# Patient Record
Sex: MALE | Race: WHITE | Employment: FULL TIME | URBAN - METROPOLITAN AREA
[De-identification: names, ages, dates, MRNs, and addresses within clinical notes are randomized per-mention and may not be internally consistent; named-entity substitution may affect disease eponyms.]

---

## 2017-04-15 RX ORDER — ALPRAZOLAM 0.5 MG/1
TABLET ORAL
Qty: 180 TABLET | Refills: 0 | OUTPATIENT
Start: 2017-04-15

## 2017-04-15 NOTE — TELEPHONE ENCOUNTER
Please advise on refill request.     Recent Visits       Provider Department Primary Dx    5 months ago Vishal Hyman MD [facility], [facility], [address] Anxiety    7 months ago Vishal Hyman MD [facility], [facility], [address]

## 2017-04-18 RX ORDER — ALPRAZOLAM 0.5 MG/1
TABLET ORAL
Qty: 180 TABLET | Refills: 0 | OUTPATIENT
Start: 2017-04-18 | End: 2017-06-22

## 2017-04-18 NOTE — TELEPHONE ENCOUNTER
Message was left on voice mail that the rx for Alprazolam was not filled that it will have to be gotten from his psychiatrist.

## 2017-06-01 ENCOUNTER — TELEPHONE (OUTPATIENT)
Dept: INTERNAL MEDICINE CLINIC | Facility: CLINIC | Age: 57
End: 2017-06-01

## 2017-06-01 NOTE — TELEPHONE ENCOUNTER
Spoke to pt. Informed pt Dr. Holli Powell had last filled rx on 4/18/17 for a 90 day supply. Pt states never picked up the rx and was informed by pharmacy there was no rx to pick.  Informed pt pharmacy would be called to verify if rx order was received by ph

## 2017-06-01 NOTE — TELEPHONE ENCOUNTER
Current Outpatient Prescriptions:  ALPRAZOLAM 0.5 MG Oral Tab TAKE 1 TABLET BY MOUTH TWICE DAILY.  Disp: 180 tablet Rfl: 0     Pt out of meds  Made 6/19 px appt with [facility]- first available   Asking if can get enough to hold over until appt   Requesting call

## 2017-06-01 NOTE — TELEPHONE ENCOUNTER
Pt states he is having anxiety symptoms-out of medication  Has 6/19 appt with [facility]  Asking if can get alprazolam refilled to hold over to appt-see other 6/1 encounter

## 2017-06-01 NOTE — TELEPHONE ENCOUNTER
Dr Fonseca Marrow  Patient has not made appt with psych yet, will see you for a physical 6/22/17, he had last alprazolam rx filled today which will last until 6/16/17 and is requesting an rx to last through his appt 6/22/17

## 2017-06-01 NOTE — TELEPHONE ENCOUNTER
Contacted pharmacy, they have rx for alprazolam 0.5mg 1 po BID #30 called in 4/20/17 and will fill it and contact the patient when it is ready for     Patient contacted re refill, it is only for 15 days and his appt is 6/19/17, patient was asked if

## 2017-06-01 NOTE — TELEPHONE ENCOUNTER
LMTCB PO please transfer to [facility] at [phone] today or RN at [address] contacted [place], last alprazolam dispensed 4/5/17 #60

## 2017-06-02 NOTE — TELEPHONE ENCOUNTER
also patient just  Had   180   Tab  In April /18  -should last until -7/18  pt must  See psychiatrist -  For  Future  Refills as  dicussed  At  Last Visit time  -

## 2017-06-02 NOTE — TELEPHONE ENCOUNTER
[facility] was informed he was only given a 30 day supply. He does have one more refill with understanding.

## 2017-06-22 ENCOUNTER — OFFICE VISIT (OUTPATIENT)
Dept: INTERNAL MEDICINE CLINIC | Facility: CLINIC | Age: 57
End: 2017-06-22

## 2017-06-22 VITALS
WEIGHT: 219 LBS | SYSTOLIC BLOOD PRESSURE: 132 MMHG | TEMPERATURE: 98 F | RESPIRATION RATE: 20 BRPM | HEIGHT: 76 IN | HEART RATE: 61 BPM | DIASTOLIC BLOOD PRESSURE: 88 MMHG | BODY MASS INDEX: 26.67 KG/M2

## 2017-06-22 DIAGNOSIS — F41.9 ANXIETY: ICD-10-CM

## 2017-06-22 DIAGNOSIS — Z13.6 SCREENING FOR HEART DISEASE: ICD-10-CM

## 2017-06-22 DIAGNOSIS — Z12.5 SCREENING PSA (PROSTATE SPECIFIC ANTIGEN): ICD-10-CM

## 2017-06-22 DIAGNOSIS — R35.0 FREQUENT URINATION: ICD-10-CM

## 2017-06-22 DIAGNOSIS — Z00.00 PHYSICAL EXAM: Primary | ICD-10-CM

## 2017-06-22 PROCEDURE — 99396 PREV VISIT EST AGE 40-64: CPT | Performed by: INTERNAL MEDICINE

## 2017-06-22 RX ORDER — ALPRAZOLAM 0.5 MG/1
TABLET ORAL
Qty: 60 TABLET | Refills: 0 | Status: SHIPPED | OUTPATIENT
Start: 2017-06-22 | End: 2017-07-29

## 2017-06-22 NOTE — PROGRESS NOTES
HPI:    Patient ID: Yee Harley is a 60year old male.     HPI Comments: Patient patient presents today for physical exam, states doing well otherwise, denies chest pain, shortness of breath, dyspnea on exertion or heart palpitations also denies nausea, vo Prescriptions:  ALPRAZolam 0.5 MG Oral Tab TAKE 1 TABLET BY MOUTH ONCE  DAILY. Disp: 60 tablet Rfl: 0     Allergies:No Known Allergies   PHYSICAL EXAM:   Physical Exam   Constitutional: He is oriented to person, place, and time.  He appears well-developed a On xanex  Stable    Nursing note and vitals reviewed. Blood pressure 132/88, pulse 61, temperature 97.9 °F (36.6 °C), temperature source Oral, resp. rate 20, height 6' 4\" (1.93 m), weight 219 lb (99.338 kg). ASSESSMENT/PLAN:   1.  Physical e PSYCHIATRY  UROLOGY - EXTERNAL  EKG 12-LEAD       #[id number]

## 2017-07-01 ENCOUNTER — LAB ENCOUNTER (OUTPATIENT)
Dept: LAB | Facility: HOSPITAL | Age: 57
End: 2017-07-01
Attending: INTERNAL MEDICINE
Payer: COMMERCIAL

## 2017-07-01 DIAGNOSIS — Z12.5 SCREENING PSA (PROSTATE SPECIFIC ANTIGEN): ICD-10-CM

## 2017-07-01 DIAGNOSIS — Z00.00 PHYSICAL EXAM: ICD-10-CM

## 2017-07-01 LAB
ALBUMIN SERPL BCP-MCNC: 4.2 G/DL (ref 3.5–4.8)
ALBUMIN/GLOB SERPL: 1.6 {RATIO} (ref 1–2)
ALP SERPL-CCNC: 43 U/L (ref 32–100)
ALT SERPL-CCNC: 34 U/L (ref 17–63)
ANION GAP SERPL CALC-SCNC: 9 MMOL/L (ref 0–18)
AST SERPL-CCNC: 30 U/L (ref 15–41)
BACTERIA UR QL AUTO: NEGATIVE /HPF
BASOPHILS # BLD: 0 K/UL (ref 0–0.2)
BASOPHILS NFR BLD: 1 %
BILIRUB SERPL-MCNC: 0.5 MG/DL (ref 0.3–1.2)
BUN SERPL-MCNC: 15 MG/DL (ref 8–20)
BUN/CREAT SERPL: 14.3 (ref 10–20)
CALCIUM SERPL-MCNC: 9.4 MG/DL (ref 8.5–10.5)
CHLORIDE SERPL-SCNC: 104 MMOL/L (ref 95–110)
CHOLEST SERPL-MCNC: 170 MG/DL (ref 110–200)
CO2 SERPL-SCNC: 27 MMOL/L (ref 22–32)
CREAT SERPL-MCNC: 1.05 MG/DL (ref 0.5–1.5)
EOSINOPHIL # BLD: 0.2 K/UL (ref 0–0.7)
EOSINOPHIL NFR BLD: 3 %
ERYTHROCYTE [DISTWIDTH] IN BLOOD BY AUTOMATED COUNT: 13 % (ref 11–15)
GLOBULIN PLAS-MCNC: 2.7 G/DL (ref 2.5–3.7)
GLUCOSE SERPL-MCNC: 102 MG/DL (ref 70–99)
HCT VFR BLD AUTO: 49 % (ref 41–52)
HDLC SERPL-MCNC: 42 MG/DL
HGB BLD-MCNC: 16.7 G/DL (ref 13.5–17.5)
LDLC SERPL CALC-MCNC: 117 MG/DL (ref 0–99)
LYMPHOCYTES # BLD: 1.3 K/UL (ref 1–4)
LYMPHOCYTES NFR BLD: 27 %
MCH RBC QN AUTO: 31.9 PG (ref 27–32)
MCHC RBC AUTO-ENTMCNC: 34 G/DL (ref 32–37)
MCV RBC AUTO: 93.8 FL (ref 80–100)
MONOCYTES # BLD: 0.5 K/UL (ref 0–1)
MONOCYTES NFR BLD: 10 %
NEUTROPHILS # BLD AUTO: 2.8 K/UL (ref 1.8–7.7)
NEUTROPHILS NFR BLD: 59 %
NONHDLC SERPL-MCNC: 128 MG/DL
OSMOLALITY UR CALC.SUM OF ELEC: 291 MOSM/KG (ref 275–295)
PLATELET # BLD AUTO: 143 K/UL (ref 140–400)
PMV BLD AUTO: 8.4 FL (ref 7.4–10.3)
POTASSIUM SERPL-SCNC: 4.6 MMOL/L (ref 3.3–5.1)
PROT SERPL-MCNC: 6.9 G/DL (ref 5.9–8.4)
PSA SERPL-MCNC: 1.2 NG/ML (ref 0–4)
RBC # BLD AUTO: 5.22 M/UL (ref 4.5–5.9)
RBC #/AREA URNS AUTO: <1 /HPF
SODIUM SERPL-SCNC: 140 MMOL/L (ref 136–144)
TRIGL SERPL-MCNC: 54 MG/DL (ref 1–149)
TSH SERPL-ACNC: 2.57 UIU/ML (ref 0.45–5.33)
WBC # BLD AUTO: 4.7 K/UL (ref 4–11)
WBC #/AREA URNS AUTO: <1 /HPF

## 2017-07-01 PROCEDURE — 80061 LIPID PANEL: CPT

## 2017-07-01 PROCEDURE — 81015 MICROSCOPIC EXAM OF URINE: CPT

## 2017-07-01 PROCEDURE — 85025 COMPLETE CBC W/AUTO DIFF WBC: CPT

## 2017-07-01 PROCEDURE — 84443 ASSAY THYROID STIM HORMONE: CPT

## 2017-07-01 PROCEDURE — 36415 COLL VENOUS BLD VENIPUNCTURE: CPT

## 2017-07-01 PROCEDURE — 80053 COMPREHEN METABOLIC PANEL: CPT

## 2017-07-06 ENCOUNTER — TELEPHONE (OUTPATIENT)
Dept: INTERNAL MEDICINE CLINIC | Facility: CLINIC | Age: 57
End: 2017-07-06

## 2017-07-07 NOTE — TELEPHONE ENCOUNTER
Actions Requested: further recommendations-appt made with dr Isabell Dominique  Problem: above right eye brow -sharp stabbing pain  Onset and Timing: just happen -but 3rd time, started last week   Associated Symptoms :x 1week  old chest discomfort-productive cough,h/a steroids)  * Severe headache (e.g., excruciating) and has had severe headaches before  * Severe headache and not relieved by pain meds  * Severe headache and vomiting  * Severe headache and fever  * New headache and weak immune system (e.g., HIV positive,

## 2017-07-10 NOTE — TELEPHONE ENCOUNTER
Patient calling back, informed patient of Dr Abimael Suarez note. Patient also requested copy of labs sent to his home. Done.

## 2017-07-10 NOTE — TELEPHONE ENCOUNTER
Collected:  7/1/2017  8:07 AM Status:  Final result Dx:  Physical exam   Notes Recorded by Jude Alicia MD on 7/8/2017 at 8:21 AM CDT  Please  Call  Patient with Blood/urine  test results that are stable/ within normal limits/ except mildly elevated

## 2017-07-27 ENCOUNTER — APPOINTMENT (OUTPATIENT)
Dept: CT IMAGING | Age: 57
End: 2017-07-27
Attending: FAMILY MEDICINE
Payer: COMMERCIAL

## 2017-07-27 ENCOUNTER — HOSPITAL ENCOUNTER (OUTPATIENT)
Age: 57
Discharge: HOME | End: 2017-07-27
Attending: FAMILY MEDICINE
Payer: COMMERCIAL

## 2017-07-27 VITALS
HEIGHT: 76 IN | HEART RATE: 52 BPM | OXYGEN SATURATION: 100 % | SYSTOLIC BLOOD PRESSURE: 135 MMHG | RESPIRATION RATE: 16 BRPM | TEMPERATURE: 98 F | WEIGHT: 225 LBS | DIASTOLIC BLOOD PRESSURE: 94 MMHG | BODY MASS INDEX: 27.4 KG/M2

## 2017-07-27 DIAGNOSIS — G44.019 EPISODIC CLUSTER HEADACHE, NOT INTRACTABLE: Primary | ICD-10-CM

## 2017-07-27 DIAGNOSIS — G93.89 ENCEPHALOMALACIA ON IMAGING STUDY: ICD-10-CM

## 2017-07-27 PROCEDURE — 99204 OFFICE O/P NEW MOD 45 MIN: CPT

## 2017-07-27 PROCEDURE — 70450 CT HEAD/BRAIN W/O DYE: CPT | Performed by: FAMILY MEDICINE

## 2017-07-27 NOTE — ED PROVIDER NOTES
Patient Seen in: [address] [facility]    History   Patient presents with:  Headache (neurologic)    Stated Complaint: sharp head pains    HPI    57-year-old gentleman presents to immediate care with pain ne no URI symptoms ar the right eye abo today and agreed except as otherwise stated in HPI.     Physical Exam   ED Triage Vitals [07/27/17 1638]  BP: 147/91  Pulse: 64  Resp: 20  Temp: 97.7 °F (36.5 °C)  Temp src: Temporal  SpO2: 100 %  O2 Device: None (Room air)    Current:/91   Pulse 64 generalized tension pains. No trauma or visual disturbances. FINDINGS:   VENTRICLES/SULCI:  Ventricles and sulci are normal in size. INTRACRANIAL:  There are no abnormal extraaxial fluid collections. There is no midline shift.   There are no acute int day      April Barrera MD  [address]  [phone]    Call in 1 day        Medications Prescribed:  Current Discharge Medication List

## 2017-07-28 ENCOUNTER — TELEPHONE (OUTPATIENT)
Dept: INTERNAL MEDICINE CLINIC | Facility: CLINIC | Age: 57
End: 2017-07-28

## 2017-07-28 NOTE — TELEPHONE ENCOUNTER
Actions Requested: ERIN F/U appt scheduled w/ Dr Karmen Centeno  7/29/17 as PCP out of office  Problem: pt is  and sudden pain above right eye and very sharp  Onset and Timing: > 1 month  Associated Symptoms: pain is intermittent always above right eye ne Rest  * Apply Cold to the Area  * Stretching  * Reasons To Call Back      - Headache lasts longer than 24 hours      - You become worse    Patient provided with clinic contact information, hours of operation and will call back if needed.  Patient was able t

## 2017-07-29 NOTE — PATIENT INSTRUCTIONS
Treating Anxiety Disorders with Medication  An anxiety disorder can make you feel nervous or apprehensive, even without a clear reason. Certain anxiety disorders can cause intense feelings of fear or panic.  You may even have physical symptoms, such as a · Antidepressant medication: This kind of medication is often used to treat anxiety, even if you aren’t depressed. An antidepressant helps balance out brain chemicals. This helps keep anxiety under control. This medication is taken on a schedule.  It takes · Addiction: Antidepressants are not addictive. And if Naty People never had a problem with drugs or alcohol, you likely won’t have a problem with anti-anxiety medication. But if you have history of addiction, you may need to avoid this medication.    Date Last

## 2017-07-29 NOTE — PROGRESS NOTES
Patient ID: Ellie Lang is a 60year old male. Patient presents with:  Headache: sharp pain, lots of stress at home        HISTORY OF PRESENT ILLNESS:   HPI  Patient presents for above.   Here for follow-up from urgent care center after having sharp ocul Marlex plug and mesh technique per Dr. Chi Jim      Current Outpatient Prescriptions:   •  ALPRAZolam 0.5 MG Oral Tab, TAKE 1 TABLET BY MOUTH ONCE  DAILY. , Disp: 60 tablet, Rfl: 2    Allergies:No Known Allergies      Social History  Social History

## 2017-08-16 ENCOUNTER — TELEPHONE (OUTPATIENT)
Dept: INTERNAL MEDICINE CLINIC | Facility: CLINIC | Age: 57
End: 2017-08-16

## 2017-08-16 NOTE — TELEPHONE ENCOUNTER
Pt stts he has questions regarding ALPRAZolam 0.5 MG Oral Tab. Pt asking to speak with Dr. Ree Farah.

## 2017-08-19 NOTE — TELEPHONE ENCOUNTER
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

## 2017-09-28 ENCOUNTER — TELEPHONE (OUTPATIENT)
Dept: INTERNAL MEDICINE CLINIC | Facility: CLINIC | Age: 57
End: 2017-09-28

## 2017-09-28 DIAGNOSIS — F41.9 ANXIETY: ICD-10-CM

## 2017-09-28 NOTE — TELEPHONE ENCOUNTER
Pt is requesting a to take medication below 2x a day due to the Pt taking it that way when he was seeing   Dr. Bria Mars. Bryce KESSLER Current Outpatient Prescriptions:   •  ALPRAZolam 0.5 MG Oral Tab, TAKE 1 TABLET BY MOUTH ONCE  DAILY. , Disp: 60 tablet

## 2017-09-29 RX ORDER — ALPRAZOLAM 0.5 MG/1
TABLET ORAL
Qty: 60 TABLET | Refills: 2 | Status: SHIPPED | OUTPATIENT
Start: 2017-09-29 | End: 2018-01-04

## 2017-09-29 NOTE — TELEPHONE ENCOUNTER
Chart reviewed again, encounter 9/20/11 under medication started or stopped shows:     Message copied:   Medications started this visit  Start Date    Med                                Dose                  Raina Ghee        Refills    Sig  09/20/2011  Barber

## 2017-09-29 NOTE — TELEPHONE ENCOUNTER
Dr Aldo Llanos,    See pts' message below and advise on new alprazolam script. It is pended for editing and signoff.      Refill Protocol Appointment Criteria  · Appointment scheduled in the past 6 months or in the next 3 months  Recent Outpatient Visits

## 2017-09-29 NOTE — TELEPHONE ENCOUNTER
Do you see anywhere from his prior prescriptions that he was taking it twice a day as needed? I was unable to locate any.

## 2017-09-29 NOTE — TELEPHONE ENCOUNTER
Alprazolam 0.5mg script called into [facility] pharmacy in [place], [place] attention to [facility] per Dr Rosey Trinidad. Lonnie Zavaleta erx  written orders. Pt informed and thankful.

## 2017-10-13 ENCOUNTER — TELEPHONE (OUTPATIENT)
Dept: FAMILY MEDICINE CLINIC | Facility: CLINIC | Age: 57
End: 2017-10-13

## 2017-10-13 NOTE — TELEPHONE ENCOUNTER
Patient is schedule for Monday at 3:30 pm for a NP Exam - Migraines/head pains. 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  Wants to know if Dr Khris White will approve for him to come in on Saturday 10/21/17 ins [id number]

## 2017-10-13 NOTE — TELEPHONE ENCOUNTER
I already have two medication follow ups and a physical for that day. He can see me on a Monday night or my next Saturday, but I need to leave room for acute visits also on 10/21. Thanks.

## 2017-10-16 ENCOUNTER — TELEPHONE (OUTPATIENT)
Dept: FAMILY MEDICINE CLINIC | Facility: CLINIC | Age: 57
End: 2017-10-16

## 2017-10-16 NOTE — TELEPHONE ENCOUNTER
Patient called at 4:15 pm to say that he was at [address] and would be another 20 minutes at least. Advised that per the nurse he has missed his appointment time and would need to reschedule as Dr Mike Wallace is booked for the rest of the day.  Patient asked

## 2017-11-07 NOTE — PROGRESS NOTES
Jarrett Tarango is a 59year old male. Patient presents with:  Establish Care  Migraine      HPI:   Headaches started 2-3 months ago. Sharp electric shock pain above his right eye. Came and went quickly over 1-2 seconds.  2-3 weeks later, happened again on th BP Readings from Last 6 Encounters:  11/06/17 : 130/82  07/29/17 : 135/84  07/27/17 : 135/94  06/22/17 : 132/88  02/15/17 : 134/86  11/07/16 : 130/70      Wt Readings from Last 6 Encounters:  11/06/17 : 230 lb  07/29/17 : 223 lb  07/27/17 : 225 lb  06/ escitalopram 10 MG Oral Tab; Take 1 tablet (10 mg total) by mouth daily. Take 1/2 tab x 6 days, then take 1 tab po qd.   -     Cancel: OP REFERRAL TO [facility]  -     OP REFERRAL TO [facility]    Tension headache  - discussed neck stretching, heat to the neck,

## 2018-01-04 ENCOUNTER — TELEPHONE (OUTPATIENT)
Dept: FAMILY MEDICINE CLINIC | Facility: CLINIC | Age: 58
End: 2018-01-04

## 2018-01-04 DIAGNOSIS — F41.9 ANXIETY: ICD-10-CM

## 2018-01-04 RX ORDER — ALPRAZOLAM 0.5 MG/1
TABLET ORAL
Qty: 60 TABLET | Refills: 0 | Status: SHIPPED | OUTPATIENT
Start: 2018-01-04 | End: 2018-02-06

## 2018-01-04 NOTE — TELEPHONE ENCOUNTER
Pt called, needs refill on ALPRAZolam 0.5 MG Oral Tab. [facility]   Please call pt at [phone]

## 2018-01-04 NOTE — TELEPHONE ENCOUNTER
Script printed. Please fax. He has appt next week, will see him before refills to see how lexapro is working.

## 2018-01-04 NOTE — TELEPHONE ENCOUNTER
Pt called, needs refill on ALPRAZolam 0.5 MG Oral Tab. Evzyqvxr-Hkvqxgxcv-Rjbarrrk.    Please call pt at [phone]

## 2018-01-04 NOTE — TELEPHONE ENCOUNTER
Script faxed. Patient has cancelled f/u for next week.   rescheduled for tomorrow    Future Appointments  Date Time Provider Sunni Huerta   1/5/2018 1:00 PM Cedric Howard [facility] VANESSA Samson

## 2018-01-05 ENCOUNTER — OFFICE VISIT (OUTPATIENT)
Dept: FAMILY MEDICINE CLINIC | Facility: CLINIC | Age: 58
End: 2018-01-05

## 2018-01-05 VITALS
WEIGHT: 234 LBS | HEIGHT: 76 IN | DIASTOLIC BLOOD PRESSURE: 80 MMHG | TEMPERATURE: 98 F | RESPIRATION RATE: 16 BRPM | BODY MASS INDEX: 28.49 KG/M2 | HEART RATE: 64 BPM | SYSTOLIC BLOOD PRESSURE: 130 MMHG

## 2018-01-05 DIAGNOSIS — F41.1 GAD (GENERALIZED ANXIETY DISORDER): ICD-10-CM

## 2018-01-05 DIAGNOSIS — F41.9 ANXIETY: ICD-10-CM

## 2018-01-05 PROCEDURE — 99214 OFFICE O/P EST MOD 30 MIN: CPT | Performed by: FAMILY MEDICINE

## 2018-01-05 RX ORDER — ESCITALOPRAM OXALATE 10 MG/1
10 TABLET ORAL DAILY
Qty: 30 TABLET | Refills: 1 | Status: SHIPPED | OUTPATIENT
Start: 2018-01-05 | End: 2018-04-19

## 2018-01-05 NOTE — PROGRESS NOTES
Christopher Iglesias is a 59year old male. Patient presents with: Follow - Up: on medications per pt      HPI:   He is still taking alprazolam as he has been. Has not started lexapro. He is taking 1 tab of xanax twice daily.  At times he takes 2 tabs once per [employer] rashes  RESPIRATORY: denies shortness of breath with exertion  CARDIOVASCULAR: denies chest pain on exertion  GI: denies abdominal pain and denies heartburn  NEURO: denies headaches    EXAM:   /80   Pulse 64   Temp (!) 97.5 °F (36.4 °C) (Temporal)

## 2018-01-23 DIAGNOSIS — F41.9 ANXIETY: ICD-10-CM

## 2018-01-25 RX ORDER — ALPRAZOLAM 0.5 MG/1
TABLET ORAL
Qty: 60 TABLET | Refills: 0 | OUTPATIENT
Start: 2018-01-25

## 2018-02-06 DIAGNOSIS — F41.9 ANXIETY: ICD-10-CM

## 2018-02-06 RX ORDER — ALPRAZOLAM 0.5 MG/1
TABLET ORAL
Qty: 60 TABLET | Refills: 0 | Status: SHIPPED | OUTPATIENT
Start: 2018-02-06 | End: 2018-03-09

## 2018-02-06 NOTE — TELEPHONE ENCOUNTER
PT CALLED AND ADV NEEDS REFILL OF     ALPRAZolam 0.5 MG Oral Tab    PLEASE SEND TO [facility]    (PT ADV OTHER MED THAT DR TRY TO GIVE TO WEAN OFF, MADE PT NAUSEA)

## 2018-02-06 NOTE — TELEPHONE ENCOUNTER
Script faxed. Patient notified alprazolam was faxed to the pharmacy. States he tried the escitalopram for 7-8 days but he did not feel any better and was nauseous so he stopped it.     Dr Tico Bean notified

## 2018-03-09 DIAGNOSIS — F41.9 ANXIETY: ICD-10-CM

## 2018-03-09 RX ORDER — ALPRAZOLAM 0.5 MG/1
TABLET ORAL
Qty: 60 TABLET | Refills: 0 | Status: SHIPPED | OUTPATIENT
Start: 2018-03-09 | End: 2018-04-10

## 2018-04-10 DIAGNOSIS — F41.9 ANXIETY: ICD-10-CM

## 2018-04-10 RX ORDER — ALPRAZOLAM 0.5 MG/1
TABLET ORAL
Qty: 60 TABLET | Refills: 2 | Status: SHIPPED | OUTPATIENT
Start: 2018-04-10 | End: 2018-07-16

## 2018-04-10 NOTE — TELEPHONE ENCOUNTER
I can give him 2 refills, but he needs to see me after that. I need to see him every 6 months while on this medication. Script printed.

## 2018-04-10 NOTE — TELEPHONE ENCOUNTER
Patient notified and verbalized understanding.   He will schedule before he runs out  Script faxed to [facility]

## 2018-04-18 ENCOUNTER — TELEPHONE (OUTPATIENT)
Dept: FAMILY MEDICINE CLINIC | Facility: CLINIC | Age: 58
End: 2018-04-18

## 2018-04-18 NOTE — TELEPHONE ENCOUNTER
Patient advised. Verbalized understanding. Patient stated he can only be seen tomorrow, Thursday before noon. Advised that Dr Matthew Velez is booked tomorrow and she is out of the office today and would forward her a message, but may not get a response tonight.

## 2018-04-18 NOTE — TELEPHONE ENCOUNTER
Patient advised.  Scheduled for 10:00am on Thursday     Future Appointments  Date Time Provider Sunni Huerta   4/19/2018 10:00 AM DO BALDOMERO Weller

## 2018-04-18 NOTE — TELEPHONE ENCOUNTER
Take Motrin, total dose 800 mg for the pain tonight if no contraindication to taking it.  See Dr. Gómez Bell tomorrow, thanks

## 2018-04-18 NOTE — TELEPHONE ENCOUNTER
Call from patient. Patient picked up a large window to unload it at work yesterday and set it on the ground. Ever since then has had lower back pain. Patient stated that today when he is moving windows the pain shoots up his back.  Patient took aspirin to h

## 2018-04-19 ENCOUNTER — OFFICE VISIT (OUTPATIENT)
Dept: FAMILY MEDICINE CLINIC | Facility: CLINIC | Age: 58
End: 2018-04-19

## 2018-04-19 VITALS
RESPIRATION RATE: 18 BRPM | HEART RATE: 56 BPM | WEIGHT: 236.81 LBS | BODY MASS INDEX: 29 KG/M2 | TEMPERATURE: 98 F | SYSTOLIC BLOOD PRESSURE: 108 MMHG | DIASTOLIC BLOOD PRESSURE: 80 MMHG

## 2018-04-19 DIAGNOSIS — T14.8XXA MUSCLE STRAIN: ICD-10-CM

## 2018-04-19 DIAGNOSIS — S39.012A BACK STRAIN, INITIAL ENCOUNTER: Primary | ICD-10-CM

## 2018-04-19 PROCEDURE — 99214 OFFICE O/P EST MOD 30 MIN: CPT | Performed by: FAMILY MEDICINE

## 2018-04-19 RX ORDER — NAPROXEN 500 MG/1
500 TABLET ORAL 2 TIMES DAILY WITH MEALS
Qty: 30 TABLET | Refills: 0 | Status: SHIPPED | OUTPATIENT
Start: 2018-04-19 | End: 2018-07-16 | Stop reason: ALTCHOICE

## 2018-04-19 NOTE — PROGRESS NOTES
Fabricio Montague is a 59year old male. 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  He felt his back pu Temp 98.1 °F (36.7 °C) (Temporal)   Resp 18   Wt 236 lb 12.8 oz   BMI 28.82 kg/m²  Body mass index is 28.82 kg/m².       GENERAL: well developed, well nourished,in no apparent distress  SKIN: no rashes,no suspicious lesions  HEENT: atraumatic, normocephali

## 2018-04-23 ENCOUNTER — APPOINTMENT (OUTPATIENT)
Dept: OTHER | Facility: HOSPITAL | Age: 58
End: 2018-04-23
Attending: PREVENTIVE MEDICINE

## 2018-04-23 ENCOUNTER — OFFICE VISIT (OUTPATIENT)
Dept: FAMILY MEDICINE CLINIC | Facility: CLINIC | Age: 58
End: 2018-04-23

## 2018-04-23 VITALS
SYSTOLIC BLOOD PRESSURE: 120 MMHG | TEMPERATURE: 98 F | RESPIRATION RATE: 16 BRPM | DIASTOLIC BLOOD PRESSURE: 70 MMHG | WEIGHT: 235 LBS | HEART RATE: 60 BPM | BODY MASS INDEX: 29 KG/M2

## 2018-04-23 DIAGNOSIS — S39.012A BACK STRAIN, INITIAL ENCOUNTER: Primary | ICD-10-CM

## 2018-04-23 DIAGNOSIS — T14.8XXA MUSCLE STRAIN: ICD-10-CM

## 2018-04-23 PROCEDURE — 99213 OFFICE O/P EST LOW 20 MIN: CPT | Performed by: FAMILY MEDICINE

## 2018-04-23 RX ORDER — CYCLOBENZAPRINE HCL 5 MG
5 TABLET ORAL 3 TIMES DAILY PRN
Qty: 10 TABLET | Refills: 0 | Status: SHIPPED | OUTPATIENT
Start: 2018-04-23 | End: 2018-07-16 | Stop reason: ALTCHOICE

## 2018-04-23 NOTE — PROGRESS NOTES
Ziyad Pinto is a 59year old male. Patient presents with:  Back Pain      HPI:   Pt is here for follow up for a back injury that occurred 1 week ago, on 4/16/18 while at work. He was lifting a heavy window and he felt his back pull.      Worked on Friday, as above     EXAM:   /70   Pulse 60   Temp 97.9 °F (36.6 °C) (Temporal)   Resp 16   Wt 235 lb   BMI 28.61 kg/m²  Body mass index is 28.61 kg/m².       GENERAL: well developed, well nourished,in no apparent distress  SKIN: no rashes,no suspicious lesio

## 2018-04-27 ENCOUNTER — OFFICE VISIT (OUTPATIENT)
Dept: FAMILY MEDICINE CLINIC | Facility: CLINIC | Age: 58
End: 2018-04-27

## 2018-04-27 ENCOUNTER — TELEPHONE (OUTPATIENT)
Dept: FAMILY MEDICINE CLINIC | Facility: CLINIC | Age: 58
End: 2018-04-27

## 2018-04-27 VITALS
DIASTOLIC BLOOD PRESSURE: 68 MMHG | WEIGHT: 232 LBS | OXYGEN SATURATION: 98 % | SYSTOLIC BLOOD PRESSURE: 118 MMHG | TEMPERATURE: 98 F | RESPIRATION RATE: 16 BRPM | HEART RATE: 58 BPM | BODY MASS INDEX: 28 KG/M2

## 2018-04-27 DIAGNOSIS — S39.012D BACK STRAIN, SUBSEQUENT ENCOUNTER: Primary | ICD-10-CM

## 2018-04-27 DIAGNOSIS — T14.8XXA MUSCLE STRAIN: ICD-10-CM

## 2018-04-27 PROCEDURE — 99213 OFFICE O/P EST LOW 20 MIN: CPT | Performed by: FAMILY MEDICINE

## 2018-04-27 NOTE — TELEPHONE ENCOUNTER
Per verbal conversation with Dr. Geneva Rodriguez she spoke with the pt directly and handled this matter.

## 2018-04-27 NOTE — PROGRESS NOTES
Tiera Butler is a 59year old male. Patient presents with: Follow - Up: per patient follow up for back pain       HPI:   Here for follow up from back injury from 1.5 weeks ago. He has been off this week and resting, doing minimal lifting.  He is doing m numbness/tingling/weakness     EXAM:   /68 (BP Location: Right arm, Patient Position: Sitting, Cuff Size: adult)   Pulse 58   Temp 98.4 °F (36.9 °C) (Temporal)   Resp 16   Wt 232 lb   SpO2 98%   BMI 28.24 kg/m²  Body mass index is 28.24 kg/m².       G

## 2018-04-27 NOTE — TELEPHONE ENCOUNTER
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   Please call pt [phone]

## 2018-07-16 ENCOUNTER — OFFICE VISIT (OUTPATIENT)
Dept: FAMILY MEDICINE CLINIC | Facility: CLINIC | Age: 58
End: 2018-07-16

## 2018-07-16 VITALS
HEART RATE: 58 BPM | HEIGHT: 76 IN | TEMPERATURE: 98 F | SYSTOLIC BLOOD PRESSURE: 132 MMHG | WEIGHT: 225 LBS | BODY MASS INDEX: 27.4 KG/M2 | RESPIRATION RATE: 16 BRPM | DIASTOLIC BLOOD PRESSURE: 76 MMHG

## 2018-07-16 DIAGNOSIS — F41.9 ANXIETY: ICD-10-CM

## 2018-07-16 DIAGNOSIS — Z00.00 HEALTHY ADULT ON ROUTINE PHYSICAL EXAMINATION: Primary | ICD-10-CM

## 2018-07-16 LAB
ALBUMIN SERPL-MCNC: 4 G/DL (ref 3.5–4.8)
ALP LIVER SERPL-CCNC: 50 U/L (ref 45–117)
ALT SERPL-CCNC: 44 U/L (ref 17–63)
AST SERPL-CCNC: 34 U/L (ref 15–41)
BILIRUB SERPL-MCNC: 0.8 MG/DL (ref 0.1–2)
BUN BLD-MCNC: 18 MG/DL (ref 8–20)
CALCIUM BLD-MCNC: 9 MG/DL (ref 8.3–10.3)
CHLORIDE: 107 MMOL/L (ref 101–111)
CHOLEST SMN-MCNC: 159 MG/DL (ref ?–200)
CO2: 27 MMOL/L (ref 22–32)
COMPLEXED PSA SERPL-MCNC: 1 NG/ML (ref 0.01–4)
CREAT BLD-MCNC: 1.15 MG/DL (ref 0.7–1.3)
GLUCOSE BLD-MCNC: 82 MG/DL (ref 70–99)
HDLC SERPL-MCNC: 42 MG/DL (ref 45–?)
HDLC SERPL: 3.79 {RATIO} (ref ?–4.97)
LDLC SERPL CALC-MCNC: 104 MG/DL (ref ?–130)
M PROTEIN MFR SERPL ELPH: 7.4 G/DL (ref 6.1–8.3)
NONHDLC SERPL-MCNC: 117 MG/DL (ref ?–130)
POTASSIUM SERPL-SCNC: 4.1 MMOL/L (ref 3.6–5.1)
SODIUM SERPL-SCNC: 142 MMOL/L (ref 136–144)
TRIGL SERPL-MCNC: 66 MG/DL (ref ?–150)
VLDLC SERPL CALC-MCNC: 13 MG/DL (ref 5–40)

## 2018-07-16 PROCEDURE — 36415 COLL VENOUS BLD VENIPUNCTURE: CPT | Performed by: FAMILY MEDICINE

## 2018-07-16 PROCEDURE — 99396 PREV VISIT EST AGE 40-64: CPT | Performed by: FAMILY MEDICINE

## 2018-07-16 PROCEDURE — 80061 LIPID PANEL: CPT | Performed by: FAMILY MEDICINE

## 2018-07-16 PROCEDURE — 80053 COMPREHEN METABOLIC PANEL: CPT | Performed by: FAMILY MEDICINE

## 2018-07-16 PROCEDURE — 84153 ASSAY OF PSA TOTAL: CPT | Performed by: FAMILY MEDICINE

## 2018-07-16 RX ORDER — ALPRAZOLAM 0.5 MG/1
TABLET ORAL
Qty: 60 TABLET | Refills: 2 | Status: SHIPPED | OUTPATIENT
Start: 2018-07-16 | End: 2018-10-08

## 2018-07-16 NOTE — PROGRESS NOTES
Patient reports dizziness with last blood draw. Patient laid down on exam table for draw today. Mint tube drawn left medial AC x 1 attempt. Patient sat up and reports feeling fine post draw. No dizziness. Patient left office in stable condition.

## 2018-07-16 NOTE — PROGRESS NOTES
Ellie Lang is a 59year old male who presents for a complete physical exam.   HPI:   Pt complains of nothing new today. BP is okay today. Taking the alprazolam as needed for anxiety, twice daily. He takes 1/2 tablet twice per day at times.  I gave COLONOSCOPY,DIAGNOSTIC      Comment: diverticulosis  07/18/16: HERNIA SURGERY Left      Comment: Done by Dr. Dominguez Pedraza  10/20/2009: HERNIA SURGERY Right      Comment: Right inguinal hernia repair with PerFix                Marlex plug and mesh technique per cyanosis, clubbing or edema  NEURO: Oriented times three,cranial nerves are intact,motor and sensory are grossly intact    ASSESSMENT AND PLAN:   Regina Navarro is a 59year old male who presents for a complete physical exam.  Pt's weight is Body mass index

## 2018-07-19 NOTE — TELEPHONE ENCOUNTER
Subjective:     Interval History:  [facility] D27. Afebrile. VSS. Continue PPX acyc, cipro, voriconazole. Continue dasatinib.Pancytopenic. .     Objective:     Vital Signs (Most Recent):  Temp: 97.9 °F (36.6 °C) (07/19/18 1140)  Pulse: 82 (07/19/18 1140)  Resp: 19 (07/19/18 1140)  BP: 121/80 (07/19/18 1140)  SpO2: 99 % (07/19/18 1140) Vital Signs (24h Range):  Temp:  [97 °F (36.1 °C)-98.7 °F (37.1 °C)] 97.9 °F (36.6 °C)  Pulse:  [82-98] 82  Resp:  [17-20] 19  SpO2:  [98 %-100 %] 99 %  BP: (103-121)/(55-80) 121/80     Weight: 61.5 kg (135 lb 9.3 oz)  Body mass index is 18.91 kg/m².  Body surface area is 1.76 meters squared.    ECOG SCORE         [unfilled]    Intake/Output - Last 3 Shifts       07/17 0700 - 07/18 0659 07/18 0700 - 07/19 0659 07/19 0700 - 07/20 0659    P.O.  410     Total Intake(mL/kg)  410 (6.7)     Urine (mL/kg/hr) 400 (0.3) 2000 (1.4)     Total Output 400 2000      Net -400 -1590             Stool Occurrence 2 x            Physical Exam   Constitutional: He is oriented to person, place, and time. He appears well-developed and well-nourished.   HENT:   Head: Normocephalic and atraumatic.   Right Ear: External ear normal.   Left Ear: External ear normal.   Mouth/Throat: Oropharynx is clear and moist. No oropharyngeal exudate.   Eyes: EOM are normal. Pupils are equal, round, and reactive to light.   Neck: Normal range of motion. Neck supple.   Cardiovascular: Normal rate and regular rhythm.    No murmur heard.  Pulmonary/Chest: Effort normal and breath sounds normal. No respiratory distress.   Abdominal: Soft. Bowel sounds are normal. He exhibits no distension.   Musculoskeletal: Normal range of motion. He exhibits no edema.   Neurological: He is alert and oriented to person, place, and time.   Skin: Skin is warm and dry. No rash noted.   Central line c/d/i  Left side of inner buttocks with open reddened wound and white appearing ulceration, improving   Psychiatric: He has a normal mood and affect.  Dr Caitlyn Berger pt confirmed he saw Dr Van Barillas (retired) before who had him on twice daily dose. One tablet alprazolam 0.5mg in am and one at bedtime. When he established visit with you he didn't notice his new refill was for daily dosing.   He need His behavior is normal. Judgment and thought content normal.   Vitals reviewed.      Significant Labs:   CBC:   Recent Labs  Lab 07/18/18  0405 07/19/18  0358   WBC 0.64* 0.80*   HGB 7.7* 7.3*   HCT 23.4* 23.0*   PLT 36* 58*    and CMP:   Recent Labs  Lab 07/18/18  0405 07/19/18  0358    142   K 3.8 4.0    108   CO2 24 23   GLU 82 83   BUN 9 7   CREATININE 0.7 0.7   CALCIUM 8.4* 8.7   PROT 5.6* 5.6*   ALBUMIN 2.5* 2.5*   BILITOT 0.2 0.1   ALKPHOS 80 81   AST 14 16   ALT 23 25   ANIONGAP 10 11   EGFRNONAA >60.0 >60.0       Diagnostic Results:  I have reviewed all pertinent imaging results/findings within the past 24 hours.

## 2018-10-08 DIAGNOSIS — F41.9 ANXIETY: ICD-10-CM

## 2018-10-08 RX ORDER — ALPRAZOLAM 0.5 MG/1
TABLET ORAL
Qty: 60 TABLET | Refills: 2 | Status: SHIPPED | OUTPATIENT
Start: 2018-10-08 | End: 2019-01-08

## 2018-10-08 NOTE — TELEPHONE ENCOUNTER
Pt called, needs refill on ALPRAZolam 0.5 MG Oral Tab. Pt would like a 3 month refill called in to [facility] in [place].   Please call pt at [phone]

## 2018-10-08 NOTE — TELEPHONE ENCOUNTER
Script has been faxed. Patient notified via detailed voicemail left at cell number (ok per  HIPAA consent).

## 2018-10-15 DIAGNOSIS — F41.9 ANXIETY: ICD-10-CM

## 2018-10-15 RX ORDER — ALPRAZOLAM 0.5 MG/1
TABLET ORAL
Qty: 60 TABLET | Refills: 0
Start: 2018-10-15

## 2018-10-15 NOTE — TELEPHONE ENCOUNTER
Call back from patient. Patient advised that he hasn't picked up script from 10/8/18 and will go pick it up. No further questions.

## 2019-01-08 DIAGNOSIS — F41.9 ANXIETY: ICD-10-CM

## 2019-01-08 RX ORDER — ALPRAZOLAM 0.5 MG/1
TABLET ORAL
Qty: 60 TABLET | Refills: 4 | Status: SHIPPED
Start: 2019-01-08 | End: 2019-06-21

## 2019-03-26 ENCOUNTER — TELEPHONE (OUTPATIENT)
Dept: FAMILY MEDICINE CLINIC | Facility: CLINIC | Age: 59
End: 2019-03-26

## 2019-03-26 NOTE — TELEPHONE ENCOUNTER
Pt advises he had hernia surgery 2 years ago. Has pain above the hernia area, saw another surgeon after the hernia surgery, who thought th hernia looked good but suggested that the pt have an xray if the pain didn't go away.    Pt wanted to come in and talk

## 2019-03-26 NOTE — TELEPHONE ENCOUNTER
Patient states he had hernia surgery a few years ago. Since then has been having intermittent abdominal pain. Bothering him more so lately. Would like to get evaluated.      appt scheduled with covering provide  Future Appointments   Date Time Provider Dinora

## 2019-04-01 ENCOUNTER — OFFICE VISIT (OUTPATIENT)
Dept: FAMILY MEDICINE CLINIC | Facility: CLINIC | Age: 59
End: 2019-04-01
Payer: COMMERCIAL

## 2019-04-01 VITALS
TEMPERATURE: 98 F | DIASTOLIC BLOOD PRESSURE: 96 MMHG | RESPIRATION RATE: 16 BRPM | BODY MASS INDEX: 28.25 KG/M2 | HEIGHT: 76 IN | HEART RATE: 64 BPM | SYSTOLIC BLOOD PRESSURE: 120 MMHG | WEIGHT: 232 LBS

## 2019-04-01 DIAGNOSIS — Z98.890 HISTORY OF LEFT INGUINAL HERNIA REPAIR: Primary | ICD-10-CM

## 2019-04-01 DIAGNOSIS — Z87.19 HISTORY OF LEFT INGUINAL HERNIA REPAIR: Primary | ICD-10-CM

## 2019-04-01 DIAGNOSIS — R10.32 LLQ ABDOMINAL PAIN: ICD-10-CM

## 2019-04-01 PROCEDURE — 99213 OFFICE O/P EST LOW 20 MIN: CPT | Performed by: FAMILY MEDICINE

## 2019-04-01 NOTE — PROGRESS NOTES
HPI:    Patient ID: Brendon Todd is a 59year old male.     Patient presents with:  Abdominal Pain: pt feels there is a possible issue from hernia surgery he had with Dr. Esthela Linder, ever since has had abdominal pain      HPI   Patient is here for LLQ abdomina Javier      Family History   Problem Relation Age of Onset   • Cancer Mother         Pancreatic cancer      Social History    Tobacco Use      Smoking status: Never Smoker      Smokeless tobacco: Never Used    Alcohol use: No    Drug use: No       PHYSICA

## 2019-04-09 ENCOUNTER — OFFICE VISIT (OUTPATIENT)
Dept: SURGERY | Facility: CLINIC | Age: 59
End: 2019-04-09
Payer: COMMERCIAL

## 2019-04-09 VITALS
BODY MASS INDEX: 26.79 KG/M2 | HEIGHT: 76 IN | DIASTOLIC BLOOD PRESSURE: 90 MMHG | HEART RATE: 65 BPM | SYSTOLIC BLOOD PRESSURE: 130 MMHG | OXYGEN SATURATION: 98 % | WEIGHT: 220 LBS

## 2019-04-09 DIAGNOSIS — R10.32 LEFT LOWER QUADRANT PAIN: Primary | ICD-10-CM

## 2019-04-09 PROCEDURE — 99244 OFF/OP CNSLTJ NEW/EST MOD 40: CPT | Performed by: SURGERY

## 2019-04-09 NOTE — H&P
Vega Wu is a 59year old male  Patient presents with:  Abdominal Pain: LLQ abdominal pain, hx of LIH repair 9/2016 Dr. Suleiman Hernandez, has had pain intermittently since then, no imaging, denies N & V, doesn't see a bulge      REFERRED BY    Patient presents unusual skin rashes or jaundice  HEENT: denies nasal congestion, sinus pain or sore throat; hearing loss negative,   EYES , no diplopia or vision changes  RESPIRATORY: denies shortness of breath, wheezing or cough   CARDIOVASCULAR: denies chest pain or GUIDRY 10.3 mg/dL Final   • Alkaline Phosphatase 07/16/2018 50  45 - 117 U/L Final   • AST 07/16/2018 34  15 - 41 U/L Final   • ALT 07/16/2018 44  17 - 63 U/L Final   • Bilirubin, Total 07/16/2018 0.8  0.1 - 2.0 mg/dL Final   • Total Protein 07/16/2018 7.4  6.1 - 07/16/2018 13  5 - 40 mg/dL Final   • Chol/HDL Ratio 07/16/2018 3.79  <4.97 Final      Interpretation of CHOL/HDL ratios:      Male:          Female:          Risk:      3.43           3.27             One half average      4.97           4.44

## 2019-06-21 DIAGNOSIS — F41.9 ANXIETY: ICD-10-CM

## 2019-06-21 RX ORDER — ALPRAZOLAM 0.5 MG/1
TABLET ORAL
Qty: 60 TABLET | Refills: 0 | Status: SHIPPED | OUTPATIENT
Start: 2019-06-21 | End: 2019-07-18

## 2019-06-21 NOTE — TELEPHONE ENCOUNTER
Pt called, needs refill on ALPRAZolam 0.5 MG Oral Tab. [facility].    Please call pt at [phone]

## 2019-07-18 ENCOUNTER — OFFICE VISIT (OUTPATIENT)
Dept: FAMILY MEDICINE CLINIC | Facility: CLINIC | Age: 59
End: 2019-07-18
Payer: COMMERCIAL

## 2019-07-18 VITALS
DIASTOLIC BLOOD PRESSURE: 92 MMHG | WEIGHT: 228.63 LBS | BODY MASS INDEX: 26.99 KG/M2 | TEMPERATURE: 98 F | RESPIRATION RATE: 12 BRPM | HEART RATE: 52 BPM | OXYGEN SATURATION: 98 % | SYSTOLIC BLOOD PRESSURE: 136 MMHG | HEIGHT: 77 IN

## 2019-07-18 DIAGNOSIS — F41.9 ANXIETY: ICD-10-CM

## 2019-07-18 DIAGNOSIS — Z00.00 HEALTHY ADULT ON ROUTINE PHYSICAL EXAMINATION: Primary | ICD-10-CM

## 2019-07-18 PROCEDURE — 99396 PREV VISIT EST AGE 40-64: CPT | Performed by: FAMILY MEDICINE

## 2019-07-18 RX ORDER — ALPRAZOLAM 0.5 MG/1
TABLET ORAL
Qty: 35 TABLET | Refills: 5 | Status: SHIPPED
Start: 2019-07-18 | End: 2019-10-10

## 2019-07-18 NOTE — PROGRESS NOTES
Jace Luana is a 59year old male who presents for a complete physical exam.   HPI:   Pt complains of nothing new today. Anxiety: well controlled. He has weaned down on xanax to once daily with an extra one occasionally.      Had foot surgery in [address] [place] [address] COLONOSCOPY,DIAGNOSTIC  2/15/17    diverticulosis   • HERNIA SURGERY Left 07/18/16    Done by Dr. Brenda Alvarez   • HERNIA SURGERY Right 10/20/2009    Right inguinal hernia repair with PerFix Marlex plug and mesh technique per Dr. Bryan Liang      Family History and sensory are grossly intact    ASSESSMENT AND PLAN:   Halima Howard is a 59year old male who presents for a complete physical exam.  Pt's weight is Body mass index is 27.11 kg/m². , recommended low fat diet and aerobic exercise 30 minutes three times wee

## 2019-07-23 LAB
ABSOLUTE BASOPHILS: 23 CELLS/UL (ref 0–200)
ABSOLUTE EOSINOPHILS: 291 CELLS/UL (ref 15–500)
ABSOLUTE LYMPHOCYTES: 1796 CELLS/UL (ref 850–3900)
ABSOLUTE MONOCYTES: 616 CELLS/UL (ref 200–950)
ABSOLUTE NEUTROPHILS: 2975 CELLS/UL (ref 1500–7800)
ALBUMIN/GLOBULIN RATIO: 1.8 (CALC) (ref 1–2.5)
ALBUMIN: 4.3 G/DL (ref 3.6–5.1)
ALKALINE PHOSPHATASE: 44 U/L (ref 40–115)
ALT: 25 U/L (ref 9–46)
AST: 22 U/L (ref 10–35)
BASOPHILS: 0.4 %
BILIRUBIN, TOTAL: 0.5 MG/DL (ref 0.2–1.2)
BUN: 14 MG/DL (ref 7–25)
CALCIUM: 9.4 MG/DL (ref 8.6–10.3)
CARBON DIOXIDE: 28 MMOL/L (ref 20–32)
CHLORIDE: 105 MMOL/L (ref 98–110)
CHOL/HDLC RATIO: 3.6 (CALC)
CHOLESTEROL, TOTAL: 157 MG/DL
CREATININE: 1.08 MG/DL (ref 0.7–1.25)
EGFR IF AFRICN AM: 86 ML/MIN/1.73M2
EGFR IF NONAFRICN AM: 74 ML/MIN/1.73M2
EOSINOPHILS: 5.1 %
GLOBULIN: 2.4 G/DL (CALC) (ref 1.9–3.7)
GLUCOSE: 90 MG/DL (ref 65–99)
HDL CHOLESTEROL: 44 MG/DL
HEMATOCRIT: 48.1 % (ref 38.5–50)
HEMOGLOBIN: 16.4 G/DL (ref 13.2–17.1)
LDL-CHOLESTEROL: 95 MG/DL (CALC)
LYMPHOCYTES: 31.5 %
MCH: 31 PG (ref 27–33)
MCHC: 34.1 G/DL (ref 32–36)
MCV: 90.9 FL (ref 80–100)
MONOCYTES: 10.8 %
MPV: 10.5 FL (ref 7.5–12.5)
NEUTROPHILS: 52.2 %
NON-HDL CHOLESTEROL: 113 MG/DL (CALC)
PLATELET COUNT: 165 THOUSAND/UL (ref 140–400)
POTASSIUM: 4.3 MMOL/L (ref 3.5–5.3)
PROTEIN, TOTAL: 6.7 G/DL (ref 6.1–8.1)
PSA, TOTAL: 1.1 NG/ML
RDW: 12.7 % (ref 11–15)
RED BLOOD CELL COUNT: 5.29 MILLION/UL (ref 4.2–5.8)
SODIUM: 141 MMOL/L (ref 135–146)
TRIGLYCERIDES: 86 MG/DL
WHITE BLOOD CELL COUNT: 5.7 THOUSAND/UL (ref 3.8–10.8)

## 2019-08-22 ENCOUNTER — OFFICE VISIT (OUTPATIENT)
Dept: FAMILY MEDICINE CLINIC | Facility: CLINIC | Age: 59
End: 2019-08-22
Payer: COMMERCIAL

## 2019-08-22 VITALS
HEART RATE: 64 BPM | SYSTOLIC BLOOD PRESSURE: 140 MMHG | RESPIRATION RATE: 16 BRPM | OXYGEN SATURATION: 98 % | TEMPERATURE: 97 F | DIASTOLIC BLOOD PRESSURE: 90 MMHG | WEIGHT: 227 LBS | BODY MASS INDEX: 27 KG/M2

## 2019-08-22 DIAGNOSIS — R20.0 NUMBNESS AND TINGLING: Primary | ICD-10-CM

## 2019-08-22 DIAGNOSIS — R20.2 NUMBNESS AND TINGLING: Primary | ICD-10-CM

## 2019-08-22 PROCEDURE — 99213 OFFICE O/P EST LOW 20 MIN: CPT | Performed by: FAMILY MEDICINE

## 2019-08-22 RX ORDER — GABAPENTIN 300 MG/1
300 CAPSULE ORAL DAILY
Qty: 30 CAPSULE | Refills: 0 | Status: SHIPPED | OUTPATIENT
Start: 2019-08-22 | End: 2021-06-03 | Stop reason: ALTCHOICE

## 2019-08-22 NOTE — PATIENT INSTRUCTIONS
Gabapentin can make you drowsy. Do not drive, drink alcohol or operate heavy machinery while taking this medication.  Space this medication out from Alprazolam.

## 2019-08-22 NOTE — PROGRESS NOTES
HPI:    Patient ID: Bala Castellanos is a 59year old male. Patient presents with:  Tingling      HPI  Patient is here for numbness and tingling intermittently in his head for the past 1 month. No headache. Denies dizziness. No visual disturbance.   Tingli Never Used    Alcohol use: No    Drug use: No       PHYSICAL EXAM:   Physical Exam   Constitutional: He appears well-developed. HENT:   Head: Normocephalic. Nose: Right sinus exhibits no maxillary sinus tenderness and no frontal sinus tenderness.  Left

## 2019-09-03 DIAGNOSIS — F41.9 ANXIETY: ICD-10-CM

## 2019-09-05 RX ORDER — ALPRAZOLAM 0.5 MG/1
TABLET ORAL
OUTPATIENT
Start: 2019-09-05

## 2019-09-07 NOTE — TELEPHONE ENCOUNTER
Aung Childers at [place] notified and verbalized understanding. States she ran the 30 day and it is covered.

## 2019-10-07 ENCOUNTER — TELEPHONE (OUTPATIENT)
Dept: FAMILY MEDICINE CLINIC | Facility: CLINIC | Age: 59
End: 2019-10-07

## 2019-10-07 DIAGNOSIS — F41.9 ANXIETY: ICD-10-CM

## 2019-10-07 NOTE — TELEPHONE ENCOUNTER
ALPRAZolam 0.5 MG Oral Tab - NEEDS A 90 DAY SUPPLY OR INSURANCE WILL NO LONGER COVER IT.     [facility] IN [place]

## 2019-10-07 NOTE — TELEPHONE ENCOUNTER
Spoke with Letty Potter at [place] [address] who states patient can fill Alprazolam monthly at [facility] and it will be $3.05.    Per KE, no 90 day supply will be given as this is a controlled substance. Patient notified and verbalized understanding.    States he

## 2019-10-09 ENCOUNTER — TELEPHONE (OUTPATIENT)
Dept: FAMILY MEDICINE CLINIC | Facility: CLINIC | Age: 59
End: 2019-10-09

## 2019-10-09 DIAGNOSIS — F41.9 ANXIETY: ICD-10-CM

## 2019-10-09 NOTE — TELEPHONE ENCOUNTER
Patient said he spoke with a nurse last week about his alprazolam. No refill was called in. He understands we can't do the 90 days but can we call in the one month refill? He wants it to [facility] in [address] [facility] in [place]. Please call back.

## 2019-10-09 NOTE — TELEPHONE ENCOUNTER
Alprazolam last refilled on 7/18/19 for #35 with 5 refills sent to [facility]. Called and spoke with Pinky Mauro. No refills remaining. Last refilled on 8/21/19. States they did not receive order from 7/18/19. Routing to Dr. Wandy Ramsey for review.  Yola

## 2019-10-10 RX ORDER — ALPRAZOLAM 0.5 MG/1
TABLET ORAL
Qty: 35 TABLET | Refills: 5 | Status: SHIPPED | OUTPATIENT
Start: 2019-10-10 | End: 2020-02-06

## 2020-02-06 DIAGNOSIS — F41.9 ANXIETY: ICD-10-CM

## 2020-02-06 RX ORDER — ALPRAZOLAM 0.5 MG/1
TABLET ORAL
Qty: 35 TABLET | Refills: 5 | Status: SHIPPED | OUTPATIENT
Start: 2020-02-06 | End: 2020-03-26

## 2020-02-06 NOTE — TELEPHONE ENCOUNTER
PT CALLED AND ADV NEEDS REFILL OF    ALPRAZolam 0.5 MG Oral Tab    PLEASE SEND TO [facility]    THANK YOU

## 2020-02-07 DIAGNOSIS — F41.9 ANXIETY: ICD-10-CM

## 2020-02-08 RX ORDER — ALPRAZOLAM 0.5 MG/1
TABLET ORAL
Qty: 35 TABLET | Refills: 0 | OUTPATIENT
Start: 2020-02-08

## 2020-03-26 ENCOUNTER — TELEPHONE (OUTPATIENT)
Dept: FAMILY MEDICINE CLINIC | Facility: CLINIC | Age: 60
End: 2020-03-26

## 2020-03-26 DIAGNOSIS — F41.9 ANXIETY: ICD-10-CM

## 2020-03-26 RX ORDER — ALPRAZOLAM 0.5 MG/1
0.5 TABLET ORAL 3 TIMES DAILY PRN
Qty: 90 TABLET | Refills: 0 | Status: SHIPPED | OUTPATIENT
Start: 2020-03-26 | End: 2020-05-01

## 2020-03-26 NOTE — TELEPHONE ENCOUNTER
Currently prescribed for BID dosing. Patient states he used to take TID but wanted to cut back at last physical. States he is working overnights now and is feeling more stressed lately. Would like to know if he can go back up to TID.     Would need scrip

## 2020-05-01 DIAGNOSIS — F41.9 ANXIETY: ICD-10-CM

## 2020-05-01 RX ORDER — ALPRAZOLAM 0.5 MG/1
TABLET ORAL
Qty: 90 TABLET | Refills: 0 | Status: SHIPPED | OUTPATIENT
Start: 2020-05-01 | End: 2020-06-01

## 2020-06-01 DIAGNOSIS — F41.9 ANXIETY: ICD-10-CM

## 2020-06-01 RX ORDER — ALPRAZOLAM 0.5 MG/1
TABLET ORAL
Qty: 90 TABLET | Refills: 0 | Status: SHIPPED | OUTPATIENT
Start: 2020-06-01 | End: 2020-07-05

## 2020-06-02 NOTE — TELEPHONE ENCOUNTER
Patient has physical scheduled for 7/20/20. Previous physical was on 7/18/19. Do you need to see him prior to this for a med check?

## 2020-07-03 DIAGNOSIS — F41.9 ANXIETY: ICD-10-CM

## 2020-07-05 RX ORDER — ALPRAZOLAM 0.5 MG/1
TABLET ORAL
Qty: 90 TABLET | Refills: 0 | Status: SHIPPED | OUTPATIENT
Start: 2020-07-05 | End: 2020-07-30

## 2020-07-08 NOTE — TELEPHONE ENCOUNTER
LVM advised KE refilled rx request and needing med f/u & px. Ok per verbal release form. Office number was provided to schedule.

## 2020-07-30 DIAGNOSIS — F41.9 ANXIETY: ICD-10-CM

## 2020-07-30 RX ORDER — ALPRAZOLAM 0.5 MG/1
0.5 TABLET ORAL 3 TIMES DAILY PRN
Qty: 90 TABLET | Refills: 0 | Status: SHIPPED | OUTPATIENT
Start: 2020-07-30 | End: 2020-08-28

## 2020-07-30 NOTE — TELEPHONE ENCOUNTER
Last OV 8/22/19  Last refilled 7/5/2020  #90  0 refills    Future Appointments   Date Time Provider Sunni Huerta   9/8/2020  8:30 AM Abril Fabian [facility]

## 2020-07-30 NOTE — TELEPHONE ENCOUNTER
Pt called, needs refill on ALPRAZOLAM 0.5 MG Oral Tab. Pharmacy-[facility].   Please call pt at [phone]

## 2020-08-28 DIAGNOSIS — F41.9 ANXIETY: ICD-10-CM

## 2020-08-28 RX ORDER — ALPRAZOLAM 0.5 MG/1
0.5 TABLET ORAL 3 TIMES DAILY PRN
Qty: 90 TABLET | Refills: 0 | Status: SHIPPED | OUTPATIENT
Start: 2020-08-28 | End: 2020-09-29

## 2020-08-28 NOTE — TELEPHONE ENCOUNTER
ALPRAZolam 0.5 MG Oral Tab    [facility] #[id number] - [place], [place] - [address] [place] [address] ([address]), [phone], [phone]

## 2020-08-28 NOTE — TELEPHONE ENCOUNTER
No refill protocol for this medication.     Last refill: 7/30/2020 #90 with 0 refills  Last Visit: 7/18/2019  Next Visit:   Future Appointments   Date Time Provider Sunni Huerta   9/8/2020  8:30 AM Tiffany Ramsey [facility]

## 2020-09-29 DIAGNOSIS — F41.9 ANXIETY: ICD-10-CM

## 2020-09-29 RX ORDER — ALPRAZOLAM 0.5 MG/1
0.5 TABLET ORAL 3 TIMES DAILY PRN
Qty: 90 TABLET | Refills: 0 | Status: SHIPPED | OUTPATIENT
Start: 2020-09-29 | End: 2020-11-27

## 2020-09-29 NOTE — TELEPHONE ENCOUNTER
ALPRAZolam 0.5 MG Oral Tab    [facility] #[id number] - [place], [place] - [address] ([address]), [phone], [phone]

## 2020-09-30 NOTE — TELEPHONE ENCOUNTER
Script sent, but if he cancels this upcoming appt, I will no longer fill it. He needs to come see me, it's been over a year.

## 2020-09-30 NOTE — TELEPHONE ENCOUNTER
Spoke to pt advising KE sent script and needing to keep appt on 11/2/20 with her. Verbally understood with no further questions.

## 2020-11-27 ENCOUNTER — OFFICE VISIT (OUTPATIENT)
Dept: FAMILY MEDICINE CLINIC | Facility: CLINIC | Age: 60
End: 2020-11-27
Payer: COMMERCIAL

## 2020-11-27 VITALS
DIASTOLIC BLOOD PRESSURE: 86 MMHG | WEIGHT: 228 LBS | BODY MASS INDEX: 27.76 KG/M2 | TEMPERATURE: 98 F | OXYGEN SATURATION: 98 % | SYSTOLIC BLOOD PRESSURE: 138 MMHG | RESPIRATION RATE: 18 BRPM | HEIGHT: 76 IN | HEART RATE: 72 BPM

## 2020-11-27 DIAGNOSIS — Z12.5 SCREENING FOR MALIGNANT NEOPLASM OF PROSTATE: ICD-10-CM

## 2020-11-27 DIAGNOSIS — Z00.00 HEALTHY ADULT ON ROUTINE PHYSICAL EXAMINATION: Primary | ICD-10-CM

## 2020-11-27 DIAGNOSIS — F41.9 ANXIETY: ICD-10-CM

## 2020-11-27 PROCEDURE — 80053 COMPREHEN METABOLIC PANEL: CPT | Performed by: FAMILY MEDICINE

## 2020-11-27 PROCEDURE — 3079F DIAST BP 80-89 MM HG: CPT | Performed by: FAMILY MEDICINE

## 2020-11-27 PROCEDURE — 3008F BODY MASS INDEX DOCD: CPT | Performed by: FAMILY MEDICINE

## 2020-11-27 PROCEDURE — 80061 LIPID PANEL: CPT | Performed by: FAMILY MEDICINE

## 2020-11-27 PROCEDURE — 3075F SYST BP GE 130 - 139MM HG: CPT | Performed by: FAMILY MEDICINE

## 2020-11-27 PROCEDURE — 85025 COMPLETE CBC W/AUTO DIFF WBC: CPT | Performed by: FAMILY MEDICINE

## 2020-11-27 PROCEDURE — 99396 PREV VISIT EST AGE 40-64: CPT | Performed by: FAMILY MEDICINE

## 2020-11-27 RX ORDER — ALPRAZOLAM 0.5 MG/1
0.5 TABLET ORAL 3 TIMES DAILY PRN
Qty: 90 TABLET | Refills: 0 | Status: SHIPPED | OUTPATIENT
Start: 2020-11-27 | End: 2020-12-29

## 2020-11-27 NOTE — PROGRESS NOTES
Lary Herman is a 56year old male who presents for a complete physical exam.   HPI:   Pt complains of nothing new today. Changed jobs a few times this year. Was in [place] for a long time. Now working for [employer]. He is happy there.  Insurance active as of hernia    • Anxiety state, unspecified    • Contact dermatitis and other eczema, due to unspecified cause    • Hearing loss    • Hemorrhoids    • Wears glasses       Past Surgical History:   Procedure Laterality Date   • CHOLECYSTECTOMY     • COLONOSCOPY, are clear  EYES:PERRLA, EOMI,  conjunctiva are clear  NECK: supple,no adenopathy,   CHEST: no chest tenderness  BREAST: no dominant or suspicious mass  LUNGS: clear to auscultation  CARDIO: RRR without murmur  GI: good BS's,no masses, HSM or tenderness

## 2020-12-02 NOTE — TELEPHONE ENCOUNTER
The pharmacy is out of the medication dosage only have the 1mg. They told patient they could fill that so he can cut them in half with authorization from pcp. They told patient they sent us a request and haven't heard back from us. Please call back.

## 2020-12-08 NOTE — PROGRESS NOTES
This is a telemedicine visit with live, interactive video and audio. Patient understands and accepts financial responsibility for any deductible, co-insurance and/or co-pays associated with this service.     SUBJECTIVE  ***    HISTORY:  Past Medical His

## 2020-12-08 NOTE — PROGRESS NOTES
This is a telemedicine visit with live, interactive video and audio. Patient understands and accepts financial responsibility for any deductible, co-insurance and/or co-pays associated with this service.     SUBJECTIVE  When he is laying down in the bed Dr. Barrientos Nip      Family History   Problem Relation Age of Onset   • Cancer Mother         Pancreatic cancer      Social History    Tobacco Use      Smoking status: Never Smoker      Smokeless tobacco: Never Used    Alcohol use: No    Drug use: No       No to provide continuity of care in the best interest of the provider-patient relationship, due to the ongoing public health crisis/national emergency and because of restrictions of visitation.   There are limitations of this visit as no physical exam could be

## 2020-12-29 DIAGNOSIS — F41.9 ANXIETY: ICD-10-CM

## 2020-12-29 RX ORDER — ALPRAZOLAM 0.5 MG/1
0.5 TABLET ORAL 3 TIMES DAILY PRN
Qty: 90 TABLET | Refills: 0 | Status: SHIPPED | OUTPATIENT
Start: 2020-12-29 | End: 2020-12-31

## 2020-12-29 NOTE — TELEPHONE ENCOUNTER
Last OV 12/8/2020 (telemed)  Last refilled 12/2/2020  Has 2 doses in chart but looks like 1 mg was ordered to be cut in half due to pharmacy being out of 0.5 mg tabs.     Alprazolam 0.5 mg tab pended

## 2020-12-29 NOTE — TELEPHONE ENCOUNTER
Please refill ALPRAZolam 0.5 MG Oral Tab     [facility] [phone]

## 2020-12-29 NOTE — TELEPHONE ENCOUNTER
Patient states he tried taking the new medication but did not feel right on it. He has stopped the escitalopram and is still taking alprazolam. Has not weaned off. Takes alprazolam 1 mg at bedtime and 0.5 mg during the day.

## 2020-12-29 NOTE — TELEPHONE ENCOUNTER
He is actually trying to wean off of this medication. Can you call and see how he is doing? Three weeks ago, we talked about decreasing his total daily amnt by 0.5 mg per week.

## 2020-12-30 ENCOUNTER — TELEPHONE (OUTPATIENT)
Dept: FAMILY MEDICINE CLINIC | Facility: CLINIC | Age: 60
End: 2020-12-30

## 2020-12-30 DIAGNOSIS — F41.9 ANXIETY: ICD-10-CM

## 2020-12-30 NOTE — TELEPHONE ENCOUNTER
Pt called and said his script was sent to the wrong place.  It is supposed to go to [facility] in [place]     Please resend script to [facility] in [place]  For the ALPRAZolam 0.5 MG Oral Tab

## 2020-12-31 RX ORDER — ALPRAZOLAM 0.5 MG/1
0.5 TABLET ORAL 3 TIMES DAILY PRN
Qty: 90 TABLET | Refills: 0 | Status: SHIPPED | OUTPATIENT
Start: 2020-12-31 | End: 2021-01-29

## 2020-12-31 NOTE — TELEPHONE ENCOUNTER
Spoke with Hakeem Cope at [facility] in [place] and cancelled alprazolam sent 12/29/2020. Attempted to contact patient to advised script resent to [id number] [facility] but after 12 rings there was no answer or voicemail. Pharmacy to notify patient.

## 2021-01-01 DIAGNOSIS — F41.9 ANXIETY: ICD-10-CM

## 2021-01-02 RX ORDER — ESCITALOPRAM OXALATE 10 MG/1
TABLET ORAL
Qty: 30 TABLET | Refills: 1 | OUTPATIENT
Start: 2021-01-02

## 2021-01-02 NOTE — TELEPHONE ENCOUNTER
I believe he just said he wasn't taking this (see alprazolam refill encounter last week). Does he want to try again at lower dose or try a different medication? How long did he take it for?

## 2021-01-29 DIAGNOSIS — F41.9 ANXIETY: ICD-10-CM

## 2021-01-29 RX ORDER — ALPRAZOLAM 0.5 MG/1
0.5 TABLET ORAL 3 TIMES DAILY PRN
Qty: 90 TABLET | Refills: 0 | Status: SHIPPED | OUTPATIENT
Start: 2021-01-29 | End: 2021-02-24

## 2021-02-10 ENCOUNTER — OFFICE VISIT (OUTPATIENT)
Dept: FAMILY MEDICINE CLINIC | Facility: CLINIC | Age: 61
End: 2021-02-10
Payer: COMMERCIAL

## 2021-02-10 VITALS
BODY MASS INDEX: 28.01 KG/M2 | RESPIRATION RATE: 18 BRPM | WEIGHT: 230 LBS | HEIGHT: 76 IN | TEMPERATURE: 99 F | SYSTOLIC BLOOD PRESSURE: 132 MMHG | HEART RATE: 64 BPM | DIASTOLIC BLOOD PRESSURE: 68 MMHG | OXYGEN SATURATION: 98 %

## 2021-02-10 DIAGNOSIS — R51.9 ACUTE NONINTRACTABLE HEADACHE, UNSPECIFIED HEADACHE TYPE: ICD-10-CM

## 2021-02-10 DIAGNOSIS — Z20.822 SUSPECTED COVID-19 VIRUS INFECTION: Primary | ICD-10-CM

## 2021-02-10 DIAGNOSIS — J02.9 SORE THROAT: ICD-10-CM

## 2021-02-10 LAB
CONTROL LINE PRESENT WITH A CLEAR BACKGROUND (YES/NO): PRESENT YES/NO
KIT LOT #: NORMAL NUMERIC
STREP GRP A CUL-SCR: NEGATIVE

## 2021-02-10 PROCEDURE — 3075F SYST BP GE 130 - 139MM HG: CPT | Performed by: NURSE PRACTITIONER

## 2021-02-10 PROCEDURE — 87880 STREP A ASSAY W/OPTIC: CPT | Performed by: NURSE PRACTITIONER

## 2021-02-10 PROCEDURE — 3008F BODY MASS INDEX DOCD: CPT | Performed by: NURSE PRACTITIONER

## 2021-02-10 PROCEDURE — 99213 OFFICE O/P EST LOW 20 MIN: CPT | Performed by: NURSE PRACTITIONER

## 2021-02-10 PROCEDURE — 3078F DIAST BP <80 MM HG: CPT | Performed by: NURSE PRACTITIONER

## 2021-02-10 NOTE — PATIENT INSTRUCTIONS
1. Rest. Drink plenty of fluids. 2. Tylenol/Ibuprofen for pain/fevers. 3. Salt water gargles three times daily  4. Use humidifier at home when possible. 5. The rapid strep test was negative today. 6. Covid-19 testing sent to lab.   Self quarantine at th somehow got respiratory droplets on you    Reducing the length of quarantine may make it easier for people to quarantine by reducing the time they cannot work.  A shorter quarantine period also can lessen stress on the public health system, especially when your hands often with soap and water for at least 20 seconds or clean your hands with an alcohol-based hand  that contains at least 60% alcohol. 8. As much as possible, stay in a specific room and away from other people in your home.  Also, you s have a fever with cough or shortness of breath but have not been exposed to someone with COVID-19 and have not tested positive for COVID-19, you should also stay home and away from others for a total of 10 days after your symptoms started, and at least 24 convalescent plasma donors must:    · Have had a confirmed diagnosis of COVID-19  · Be symptom-free for at least 14 days*    *Some people will be required to have a repeat COVID-19 test in order to be eligible to donate.  If you’re instructed by Dimas Bear abelardo can also make the throat or tonsils more likely to be infected by bacteria. Severe, untreated tonsillitis in children or adults can cause a pocket of pus (abscess) to form near the tonsil.    Your evaluation  A health evaluation can help find the cause of y pops. Adults and older children may try lozenges. · Drink warm liquids to soothe the throat and help thin mucus. Stay away from alcohol, spicy foods, and acidic drinks such as orange juice. These can irritate the throat.   · Gargle with warm saltwater ( 1  provider   Call your healthcare provider immediately if any of the following occur:   · Problems swallowing  · Symptoms worsen, or new symptoms develop. · Swollen tonsils make breathing difficult.   · The pain is severe enough to keep you from drinking liq shower. Use medicines  Aspirin or other over-the-counter (OTC) pain medicines such as ibuprofen and acetaminophen can relieve headache. Remember: Never give aspirin to anyone 25years old or younger because of the risk of getting Reye syndrome.  Use pain m call \"the worst headache you have ever had\" or a severe, persistent headache that gets worse or doesn't get better with treatment  Marc last reviewed this educational content on 7/1/2020  © 3134-2144 [employer]. All rights reserved.  Pee Zarco

## 2021-02-10 NOTE — PROGRESS NOTES
CHIEF COMPLAINT:   Patient presents with:  Covid: runny nose, headache since yesterday. No known close exposure      HPI:   Jace Craft is a 56year old male who presents for covid-19 testing.  Patient reports runny nose, sore throat and intermittent heada • HERNIA SURGERY Left 07/18/16    Done by Dr. Charlotte Morales   • HERNIA SURGERY Right 10/20/2009    Right inguinal hernia repair with PerFix Marlex plug and mesh technique per Dr. Vesna Caal History    Tobacco Use      Smoking status: Never Smoker NEURO: Alert & Oriented x 3. No obvious focal neuro deficit noted. Articulation intact. No nystagmus appreciated. Negative pronator drift and Romberg. Normal finger to nose and heel to shin.  2+ upper and lower extremity DTR's bilaterally, 5/5 UE and LE st 6. Covid-19 testing sent to lab. Self quarantine at this time per CDC guidelines while awaiting test result. (If test is positive will have to extend self quarantine until 10 days from onset of symptoms, AND no fever for at least 24hrs, AND and improvement Reducing the length of quarantine may make it easier for people to quarantine by reducing the time they cannot work. A shorter quarantine period also can lessen stress on the public health system, especially when new infections are rapidly rising.   Your lo 7. Wash your hands often with soap and water for at least 20 seconds or clean your hands with an alcohol-based hand  that contains at least 60% alcohol. 8. As much as possible, stay in a specific room and away from other people in your home.  Judy Valle If you have a fever with cough or shortness of breath but have not been exposed to someone with COVID-19 and have not tested positive for COVID-19, you should also stay home and away from others for a total of 10 days after your symptoms started, and at [employer] Potential convalescent plasma donors must:    · Have had a confirmed diagnosis of COVID-19  · Be symptom-free for at least 14 days*    *Some people will be required to have a repeat COVID-19 test in order to be eligible to donate.  If you’re instructed by V The tonsils and pharynx can become inflamed due to a cold or flu virus. Postnasal drip (excess mucus draining from the nasal cavity) can irritate the throat. It can also make the throat or tonsils more likely to be infected by bacteria.  Severe, untreated t Treatment depends on many factors. What is the likely cause? Is the problem recent? Does it keep coming back? In many cases, the best thing to do is to treat the symptoms, rest, and let the problem heal itself.  Antibiotics may help clear up some bacterial In some cases, tonsils need to be removed. This is often done as outpatient (same-day) surgery. Your healthcare provider may advise removing the tonsils in cases of:   · Several severe bouts of tonsillitis in a year.  “Severe” episodes include those that le Most headaches aren't serious and can be relieved with self-care. But some headaches may be a sign of another health problem like eye trouble or high blood pressure. To find the best treatment, learn what kind of headaches you get.  For tension headaches, s · Headache after an activity such as driving or working on a computer  Signs of migraine  Any of the following can be signs:   · Throbbing pain on one or both sides of your head  · Nausea or vomiting  · Extreme sensitivity to light, sound, and smells  · Br

## 2021-02-11 LAB — SARS-COV-2 RNA RESP QL NAA+PROBE: NOT DETECTED

## 2021-02-12 ENCOUNTER — TELEPHONE (OUTPATIENT)
Dept: FAMILY MEDICINE CLINIC | Facility: CLINIC | Age: 61
End: 2021-02-12

## 2021-02-12 NOTE — TELEPHONE ENCOUNTER
Spoke with patient who states he needs a copy of negative lab result. Advised I can print and place at  for  or he can print from [employer].     Patient states he will print result from [employer]

## 2021-02-12 NOTE — TELEPHONE ENCOUNTER
Pt called, Needs letter for pt employer  from Dr. Genaro Lay that pt's covid test was negative. Please call pt at [phone] with any questions.

## 2021-02-23 ENCOUNTER — OFFICE VISIT (OUTPATIENT)
Dept: FAMILY MEDICINE CLINIC | Facility: CLINIC | Age: 61
End: 2021-02-23
Payer: COMMERCIAL

## 2021-02-23 ENCOUNTER — TELEPHONE (OUTPATIENT)
Dept: FAMILY MEDICINE CLINIC | Facility: CLINIC | Age: 61
End: 2021-02-23

## 2021-02-23 VITALS
TEMPERATURE: 97 F | WEIGHT: 223.81 LBS | BODY MASS INDEX: 27 KG/M2 | DIASTOLIC BLOOD PRESSURE: 86 MMHG | SYSTOLIC BLOOD PRESSURE: 130 MMHG | RESPIRATION RATE: 16 BRPM | HEART RATE: 56 BPM

## 2021-02-23 DIAGNOSIS — F41.9 ANXIETY: ICD-10-CM

## 2021-02-23 DIAGNOSIS — G47.26 SHIFT WORK SLEEP DISORDER: ICD-10-CM

## 2021-02-23 DIAGNOSIS — R25.2 LEG CRAMPS: ICD-10-CM

## 2021-02-23 DIAGNOSIS — R26.81 UNSTEADINESS ON FEET: Primary | ICD-10-CM

## 2021-02-23 PROCEDURE — 86780 TREPONEMA PALLIDUM: CPT | Performed by: FAMILY MEDICINE

## 2021-02-23 PROCEDURE — 3079F DIAST BP 80-89 MM HG: CPT | Performed by: FAMILY MEDICINE

## 2021-02-23 PROCEDURE — 3075F SYST BP GE 130 - 139MM HG: CPT | Performed by: FAMILY MEDICINE

## 2021-02-23 PROCEDURE — 82728 ASSAY OF FERRITIN: CPT | Performed by: FAMILY MEDICINE

## 2021-02-23 PROCEDURE — 36415 COLL VENOUS BLD VENIPUNCTURE: CPT | Performed by: FAMILY MEDICINE

## 2021-02-23 PROCEDURE — 82746 ASSAY OF FOLIC ACID SERUM: CPT | Performed by: FAMILY MEDICINE

## 2021-02-23 PROCEDURE — 83036 HEMOGLOBIN GLYCOSYLATED A1C: CPT | Performed by: FAMILY MEDICINE

## 2021-02-23 PROCEDURE — 82607 VITAMIN B-12: CPT | Performed by: FAMILY MEDICINE

## 2021-02-23 PROCEDURE — 84443 ASSAY THYROID STIM HORMONE: CPT | Performed by: FAMILY MEDICINE

## 2021-02-23 PROCEDURE — 99214 OFFICE O/P EST MOD 30 MIN: CPT | Performed by: FAMILY MEDICINE

## 2021-02-23 PROCEDURE — 83735 ASSAY OF MAGNESIUM: CPT | Performed by: FAMILY MEDICINE

## 2021-02-23 NOTE — PROGRESS NOTES
Zakia Zavaleta is a 56year old male. Patient presents with:  Gait: per pt, balance is off/unsteady gait      HPI:   When he goes to work, or even at home, feels off balance. He has not fallen, but has to walk slower. Started a while ago, 6 yrs.  Getting wo (103.4 kg)  08/22/19 : 227 lb (103 kg)  07/18/19 : 228 lb 9.6 oz (103.7 kg)  04/09/19 : 220 lb (99.8 kg)      REVIEW OF SYSTEMS:   GENERAL HEALTH: feels well no complaints  SKIN: denies any unusual skin lesions or rashes  RESPIRATORY: denies shortness of b wondering if I can write a note recommending day shift or second shift due to a medical condition.    Will see what labs show first.     Orders Placed This Encounter      Ferritin      TSH W Reflex To Free T4      Vitamin B12 [E]          Order Specific Rashawn Fish

## 2021-02-23 NOTE — TELEPHONE ENCOUNTER
Pt would like to know pt REJI can write him a note telling his work he needs to work 2nd shift for him medical condition.    Please return call to [phone]

## 2021-02-24 ENCOUNTER — TELEPHONE (OUTPATIENT)
Dept: FAMILY MEDICINE CLINIC | Facility: CLINIC | Age: 61
End: 2021-02-24

## 2021-02-24 DIAGNOSIS — F41.9 ANXIETY: ICD-10-CM

## 2021-02-24 LAB
DEPRECATED HBV CORE AB SER IA-ACNC: 182 NG/ML
EST. AVERAGE GLUCOSE BLD GHB EST-MCNC: 117 MG/DL (ref 68–126)
FOLATE SERPL-MCNC: 25.7 NG/ML (ref 8.7–?)
HAV IGM SER QL: 2.3 MG/DL (ref 1.6–2.6)
HBA1C MFR BLD HPLC: 5.7 % (ref ?–5.7)
T PALLIDUM AB SER QL IA: NONREACTIVE
TSI SER-ACNC: 3.09 MIU/ML (ref 0.36–3.74)
VIT B12 SERPL-MCNC: 359 PG/ML (ref 193–986)

## 2021-02-24 RX ORDER — ALPRAZOLAM 0.5 MG/1
0.5 TABLET ORAL 3 TIMES DAILY PRN
Qty: 90 TABLET | Refills: 0 | Status: SHIPPED | OUTPATIENT
Start: 2021-02-24 | End: 2021-03-29

## 2021-02-24 NOTE — TELEPHONE ENCOUNTER
Pt called, needs refill on ALPRAZolam 0.5 MG Oral Tab. Pharmacy [facility].   Please call pt at [phone]

## 2021-03-17 DIAGNOSIS — F41.9 ANXIETY: ICD-10-CM

## 2021-03-17 NOTE — TELEPHONE ENCOUNTER
Pt did try it and did not like how he felt on this. Pt stopped on his own. 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 Pt does not need this refilled.  Deleted med f

## 2021-03-17 NOTE — TELEPHONE ENCOUNTER
No refill protocol for this medication. Last refill: 2/23/2021 #30 with 1 refill  Last Visit: 2/23/2021  Next Visit: No future appointments. Forward to Dr. Mikayla Verdugo please advise on refills. Thanks.

## 2021-03-29 DIAGNOSIS — F41.9 ANXIETY: ICD-10-CM

## 2021-03-29 RX ORDER — ALPRAZOLAM 0.5 MG/1
0.5 TABLET ORAL 3 TIMES DAILY PRN
Qty: 90 TABLET | Refills: 0 | Status: SHIPPED | OUTPATIENT
Start: 2021-03-29 | End: 2021-04-28

## 2021-04-27 DIAGNOSIS — F41.9 ANXIETY: ICD-10-CM

## 2021-04-28 RX ORDER — ALPRAZOLAM 0.5 MG/1
0.5 TABLET ORAL 3 TIMES DAILY PRN
Qty: 90 TABLET | Refills: 0 | Status: SHIPPED | OUTPATIENT
Start: 2021-04-28 | End: 2021-05-28

## 2021-05-28 DIAGNOSIS — F41.9 ANXIETY: ICD-10-CM

## 2021-05-28 RX ORDER — ALPRAZOLAM 0.5 MG/1
0.5 TABLET ORAL 3 TIMES DAILY PRN
Qty: 90 TABLET | Refills: 0 | Status: SHIPPED | OUTPATIENT
Start: 2021-05-28 | End: 2021-06-22

## 2021-06-03 ENCOUNTER — HOSPITAL ENCOUNTER (OUTPATIENT)
Dept: GENERAL RADIOLOGY | Age: 61
Discharge: HOME | End: 2021-06-03
Attending: FAMILY MEDICINE
Payer: COMMERCIAL

## 2021-06-03 ENCOUNTER — OFFICE VISIT (OUTPATIENT)
Dept: FAMILY MEDICINE CLINIC | Facility: CLINIC | Age: 61
End: 2021-06-03
Payer: COMMERCIAL

## 2021-06-03 VITALS
RESPIRATION RATE: 16 BRPM | WEIGHT: 223 LBS | TEMPERATURE: 98 F | HEIGHT: 76 IN | OXYGEN SATURATION: 99 % | DIASTOLIC BLOOD PRESSURE: 82 MMHG | BODY MASS INDEX: 27.16 KG/M2 | SYSTOLIC BLOOD PRESSURE: 124 MMHG | HEART RATE: 58 BPM

## 2021-06-03 DIAGNOSIS — R07.89 CHEST PRESSURE: ICD-10-CM

## 2021-06-03 DIAGNOSIS — R07.89 CHEST PRESSURE: Primary | ICD-10-CM

## 2021-06-03 PROCEDURE — 71046 X-RAY EXAM CHEST 2 VIEWS: CPT | Performed by: FAMILY MEDICINE

## 2021-06-03 PROCEDURE — 3079F DIAST BP 80-89 MM HG: CPT | Performed by: FAMILY MEDICINE

## 2021-06-03 PROCEDURE — 93000 ELECTROCARDIOGRAM COMPLETE: CPT | Performed by: FAMILY MEDICINE

## 2021-06-03 PROCEDURE — 3008F BODY MASS INDEX DOCD: CPT | Performed by: FAMILY MEDICINE

## 2021-06-03 PROCEDURE — 99214 OFFICE O/P EST MOD 30 MIN: CPT | Performed by: FAMILY MEDICINE

## 2021-06-03 PROCEDURE — 3074F SYST BP LT 130 MM HG: CPT | Performed by: FAMILY MEDICINE

## 2021-06-03 NOTE — PROGRESS NOTES
Jose Miguel Parsons is a 56year old male. Patient presents with:  Chest Pain: discomfort in chest for 10 days       HPI:   Starting about 10 days ago, in his anterior chest feels congested, like a chest cold.  He has not been tested for COVID, doesn't know if he (101.5 kg)  02/10/21 : 230 lb (104.3 kg)  11/27/20 : 228 lb (103.4 kg)  08/22/19 : 227 lb (103 kg)  07/18/19 : 228 lb 9.6 oz (103.7 kg)      REVIEW OF SYSTEMS:   GENERAL HEALTH: feels well no complaints  SKIN: denies any unusual skin lesions or rashes  RES Prescriptions      No prescriptions requested or ordered in this encounter             The patient indicates understanding of these issues and agrees to the plan.

## 2021-06-22 DIAGNOSIS — F41.9 ANXIETY: ICD-10-CM

## 2021-06-22 RX ORDER — ALPRAZOLAM 0.5 MG/1
0.5 TABLET ORAL 3 TIMES DAILY PRN
Qty: 90 TABLET | Refills: 0 | Status: SHIPPED | OUTPATIENT
Start: 2021-06-27 | End: 2021-07-29

## 2021-06-22 NOTE — TELEPHONE ENCOUNTER
Script sent. He's a few days early, so will need to wait until the pharmacy is okay filling it. No early refill.

## 2021-06-22 NOTE — TELEPHONE ENCOUNTER
Patient called requesting refill for:    ALPRAZolam 0.5 MG Oral Tab 90 tablet     [facility]/PHARMACY [address] [place] [address] - [address] [phone], [phone]    He has a couple of pills left.  Please advise# [phone]

## 2021-06-25 ENCOUNTER — TELEPHONE (OUTPATIENT)
Dept: FAMILY MEDICINE CLINIC | Facility: CLINIC | Age: 61
End: 2021-06-25

## 2021-06-25 NOTE — TELEPHONE ENCOUNTER
PATIENT WAS SICK THE LAST FEW DAYS HE DID  NOT GO TO WORK  HE NEEDS TO GET A RETURN TO WORK .   DOES HE NEED TO BE SEEN? HE IS RETURNING TONIGHT

## 2021-06-25 NOTE — PROGRESS NOTES
This is a telemedicine visit with live, interactive video and audio. Patient understands and accepts financial responsibility for any deductible, co-insurance and/or co-pays associated with this service. SUBJECTIVE  Would like a note for work.  He wa 1 tablet (0.5 mg total) by mouth 3 (three) times daily as needed for Anxiety.  90 tablet 0       OBJECTIVE  Physical Exam:   alert, appears stated age and cooperative, Normocephalic, without obvious abnormality, atraumatic, Speaking in full sentences comfor

## 2021-07-29 DIAGNOSIS — F41.9 ANXIETY: ICD-10-CM

## 2021-07-29 RX ORDER — ALPRAZOLAM 0.5 MG/1
0.5 TABLET ORAL 3 TIMES DAILY PRN
Qty: 90 TABLET | Refills: 0 | Status: SHIPPED | OUTPATIENT
Start: 2021-07-29 | End: 2021-09-03

## 2021-07-29 NOTE — TELEPHONE ENCOUNTER
PT CALLED AND ADV NEEDS REFILL OF     ALPRAZolam 0.5 MG Oral Tab    PLEASE SEND TO [facility]     THANK YOU

## 2021-09-03 DIAGNOSIS — F41.9 ANXIETY: ICD-10-CM

## 2021-09-03 RX ORDER — ALPRAZOLAM 0.5 MG/1
0.5 TABLET ORAL 3 TIMES DAILY PRN
Qty: 90 TABLET | Refills: 0 | Status: SHIPPED | OUTPATIENT
Start: 2021-09-03 | End: 2021-09-30

## 2021-09-03 NOTE — TELEPHONE ENCOUNTER
Patient called requesting a refill for:    ALPRAZolam 0.5 MG Oral Tab 90 tablet     [facility]/PHARMACY #[id number] [place] - [address] [phone], [phone]    Please advise # [phone]

## 2021-09-30 DIAGNOSIS — F41.9 ANXIETY: ICD-10-CM

## 2021-09-30 RX ORDER — ALPRAZOLAM 0.5 MG/1
0.5 TABLET ORAL 3 TIMES DAILY PRN
Qty: 90 TABLET | Refills: 0 | Status: SHIPPED | OUTPATIENT
Start: 2021-09-30 | End: 2021-11-09

## 2021-09-30 NOTE — TELEPHONE ENCOUNTER
ALPRAZolam 0.5 MG Oral Tab    Pt would like refill sent to  [facility]/PHARMACY #[id number]- [place] - [address] [phone], [phone]

## 2021-10-25 ENCOUNTER — OFFICE VISIT (OUTPATIENT)
Dept: FAMILY MEDICINE CLINIC | Facility: CLINIC | Age: 61
End: 2021-10-25
Payer: COMMERCIAL

## 2021-10-25 VITALS
RESPIRATION RATE: 19 BRPM | BODY MASS INDEX: 28 KG/M2 | SYSTOLIC BLOOD PRESSURE: 138 MMHG | TEMPERATURE: 98 F | OXYGEN SATURATION: 99 % | DIASTOLIC BLOOD PRESSURE: 90 MMHG | HEART RATE: 71 BPM | WEIGHT: 231.19 LBS

## 2021-10-25 DIAGNOSIS — B35.6 TINEA CRURIS: Primary | ICD-10-CM

## 2021-10-25 PROCEDURE — 99213 OFFICE O/P EST LOW 20 MIN: CPT | Performed by: NURSE PRACTITIONER

## 2021-10-25 PROCEDURE — 3080F DIAST BP >= 90 MM HG: CPT | Performed by: NURSE PRACTITIONER

## 2021-10-25 PROCEDURE — 3075F SYST BP GE 130 - 139MM HG: CPT | Performed by: NURSE PRACTITIONER

## 2021-10-25 RX ORDER — CLOTRIMAZOLE AND BETAMETHASONE DIPROPIONATE 10; .64 MG/G; MG/G
1 CREAM TOPICAL 2 TIMES DAILY
Qty: 45 G | Refills: 0 | Status: SHIPPED | OUTPATIENT
Start: 2021-10-25 | End: 2021-11-08

## 2021-10-25 NOTE — PROGRESS NOTES
Subjective:   Patient ID: Jose Miguel Parsons is a 59year old male. Patient presents to the clinic today for evaluation of itching in left groin area. He denies any concern for STDs.   States symptoms began several week if not months ago very itchy left groin to treat as jock itch. Medicated ointment sent to pharmacy. He is asked to trial this medication for at least 2 weeks, follow-up with the office should symptoms persist or worsen.   Discussed signs of worsening symptoms such as skin breakdown, erythema or

## 2021-11-08 DIAGNOSIS — F41.9 ANXIETY: ICD-10-CM

## 2021-11-08 NOTE — TELEPHONE ENCOUNTER
Patient call requesting a refill for:    ALPRAZolam 0.5 MG Oral Tab 90 tablet     [facility]/PHARMACY #[id number] - [address] [phone], [phone]    Please advise # [phone]

## 2021-11-08 NOTE — TELEPHONE ENCOUNTER
No refill protocol for this medication. Last refill: 9/30/2021 #90 WITH 0 REFILLS  Last Visit: 10/25/2021  Next Visit: No future appointments. Forward to STEPHANIE Rutherford please advise on refills. Thanks.

## 2021-11-09 RX ORDER — ALPRAZOLAM 0.5 MG/1
0.5 TABLET ORAL 3 TIMES DAILY PRN
Qty: 90 TABLET | Refills: 0 | Status: SHIPPED | OUTPATIENT
Start: 2021-11-09 | End: 2021-12-04

## 2021-12-03 DIAGNOSIS — F41.9 ANXIETY: ICD-10-CM

## 2021-12-04 RX ORDER — ALPRAZOLAM 0.5 MG/1
0.5 TABLET ORAL 3 TIMES DAILY PRN
Qty: 90 TABLET | Refills: 0 | Status: SHIPPED | OUTPATIENT
Start: 2021-12-04 | End: 2022-01-03

## 2022-01-03 DIAGNOSIS — F41.9 ANXIETY: ICD-10-CM

## 2022-01-03 RX ORDER — ALPRAZOLAM 0.5 MG/1
0.5 TABLET ORAL 3 TIMES DAILY PRN
Qty: 90 TABLET | Refills: 0 | Status: SHIPPED | OUTPATIENT
Start: 2022-01-03 | End: 2022-02-07

## 2022-01-03 NOTE — TELEPHONE ENCOUNTER
Pt is requesting refills of alprazolam to be sent to [facility] in [place]. Pt would like refills on medication. Does not need a call back. LR:12/04/2021 #90 with 0 refills  LV: 10/25/2021  NV: No future appointments.       Forward to [place], please advise o

## 2022-01-26 ENCOUNTER — TELEPHONE (OUTPATIENT)
Dept: FAMILY MEDICINE CLINIC | Facility: CLINIC | Age: 62
End: 2022-01-26

## 2022-01-26 DIAGNOSIS — Z13.6 SCREENING FOR HEART DISEASE: Primary | ICD-10-CM

## 2022-01-26 DIAGNOSIS — R07.89 CHEST PRESSURE: ICD-10-CM

## 2022-01-26 NOTE — TELEPHONE ENCOUNTER
Pt would like to have referral be placed for stress test pt has an apt on Monday and needs referral       Pt call back # [phone]    Thank you

## 2022-01-26 NOTE — TELEPHONE ENCOUNTER
Patient states that he has an appointment on Monday at [facility] at 2:00 pm for a stress test. Patient states that he needs an order. Not sure how patient was able to schedule without an order   Patient states that he is feeling fine.  He had one done abou

## 2022-01-27 NOTE — TELEPHONE ENCOUNTER
I put in an order for CT heart score. I think that is more appropriate since he doesn't have symptoms. Can you call and confirm where and what he has appt for? And with whom?

## 2022-01-28 ENCOUNTER — TELEPHONE (OUTPATIENT)
Dept: FAMILY MEDICINE CLINIC | Facility: CLINIC | Age: 62
End: 2022-01-28

## 2022-01-28 ENCOUNTER — OFFICE VISIT (OUTPATIENT)
Dept: FAMILY MEDICINE CLINIC | Facility: CLINIC | Age: 62
End: 2022-01-28
Payer: COMMERCIAL

## 2022-01-28 VITALS
HEIGHT: 76 IN | SYSTOLIC BLOOD PRESSURE: 136 MMHG | RESPIRATION RATE: 16 BRPM | WEIGHT: 236 LBS | BODY MASS INDEX: 28.74 KG/M2 | OXYGEN SATURATION: 98 % | HEART RATE: 77 BPM | DIASTOLIC BLOOD PRESSURE: 86 MMHG | TEMPERATURE: 97 F

## 2022-01-28 DIAGNOSIS — J06.9 VIRAL URI WITH COUGH: ICD-10-CM

## 2022-01-28 DIAGNOSIS — R07.89 CHEST WALL PAIN: Primary | ICD-10-CM

## 2022-01-28 PROCEDURE — 3079F DIAST BP 80-89 MM HG: CPT | Performed by: FAMILY MEDICINE

## 2022-01-28 PROCEDURE — 3075F SYST BP GE 130 - 139MM HG: CPT | Performed by: FAMILY MEDICINE

## 2022-01-28 PROCEDURE — 99214 OFFICE O/P EST MOD 30 MIN: CPT | Performed by: FAMILY MEDICINE

## 2022-01-28 PROCEDURE — 3008F BODY MASS INDEX DOCD: CPT | Performed by: FAMILY MEDICINE

## 2022-01-28 NOTE — PROGRESS NOTES
Howard Wood is a 59year old male. Patient presents with:  Chest Pain    HPI:   Jorden Albarado presents to the office with complaints of upper respiratory tract infection, having congestion in chest for at least 2 weeks. He tested for COVID and it was negative.   H History    Tobacco Use      Smoking status: Never Smoker      Smokeless tobacco: Never Used    Vaping Use      Vaping Use: Never used    Alcohol use: No    Drug use: No       REVIEW OF SYSTEMS:   GENERAL: feels well otherwise  SKIN: no rashes  EYES:denies

## 2022-01-28 NOTE — TELEPHONE ENCOUNTER
If he can make it here by 11:45 I can see him today. Thanks. Post-Op Assessment Note    CV Status:  Stable    Pain management: adequate     Mental Status:  Alert and awake   Hydration Status:  Euvolemic   PONV Controlled:  Controlled   Airway Patency:  Patent      Post Op Vitals Reviewed: Yes      Staff: Anesthesiologist         No complications documented      BP      Temp      Pulse     Resp      SpO2

## 2022-01-28 NOTE — TELEPHONE ENCOUNTER
Pt c/o Right side chest pain - describes as Dull pain - but Not constant - for about a week    Reports that Certain movements and coughing - cause severe pain    Pt reports he's had really bad chest congestion - Has been coughing a lot past few weeks - \"r

## 2022-01-28 NOTE — TELEPHONE ENCOUNTER
PT CALLED AND ADV IS HAVING RIGHT SIDED CHEST PAIN.  LOOKING TO BE SEEN TODAY IF POSSIBLE    LOOKING FOR RECOMMENDATIONS     THANK YOU

## 2022-01-28 NOTE — TELEPHONE ENCOUNTER
Patient advised of Doctor's note below. Patient verbalized understanding.      Will schedule for today at [phone] with Dr. Karsten Melendrez

## 2022-02-01 ENCOUNTER — HOSPITAL ENCOUNTER (OUTPATIENT)
Dept: CT IMAGING | Age: 62
Discharge: HOME | End: 2022-02-01
Attending: FAMILY MEDICINE

## 2022-02-01 DIAGNOSIS — Z13.6 SCREENING FOR HEART DISEASE: ICD-10-CM

## 2022-02-07 RX ORDER — ALPRAZOLAM 0.5 MG/1
0.5 TABLET ORAL 3 TIMES DAILY PRN
Qty: 90 TABLET | Refills: 0 | Status: SHIPPED | OUTPATIENT
Start: 2022-02-07 | End: 2022-03-08

## 2022-02-17 ENCOUNTER — TELEPHONE (OUTPATIENT)
Dept: FAMILY MEDICINE CLINIC | Facility: CLINIC | Age: 62
End: 2022-02-17

## 2022-02-24 ENCOUNTER — OFFICE VISIT (OUTPATIENT)
Dept: FAMILY MEDICINE CLINIC | Facility: CLINIC | Age: 62
End: 2022-02-24
Payer: COMMERCIAL

## 2022-02-24 VITALS
HEART RATE: 78 BPM | DIASTOLIC BLOOD PRESSURE: 98 MMHG | RESPIRATION RATE: 16 BRPM | WEIGHT: 235 LBS | OXYGEN SATURATION: 98 % | BODY MASS INDEX: 28.62 KG/M2 | TEMPERATURE: 97 F | SYSTOLIC BLOOD PRESSURE: 146 MMHG | HEIGHT: 76 IN

## 2022-02-24 DIAGNOSIS — Z12.5 SCREENING FOR MALIGNANT NEOPLASM OF PROSTATE: ICD-10-CM

## 2022-02-24 DIAGNOSIS — I77.810 DILATION OF THORACIC AORTA (HCC): ICD-10-CM

## 2022-02-24 DIAGNOSIS — Z00.00 HEALTHY ADULT ON ROUTINE PHYSICAL EXAMINATION: Primary | ICD-10-CM

## 2022-02-24 DIAGNOSIS — R03.0 ELEVATED BLOOD PRESSURE READING: ICD-10-CM

## 2022-02-24 DIAGNOSIS — Z23 NEED FOR VACCINATION: ICD-10-CM

## 2022-02-24 PROCEDURE — 3080F DIAST BP >= 90 MM HG: CPT | Performed by: FAMILY MEDICINE

## 2022-02-24 PROCEDURE — 99396 PREV VISIT EST AGE 40-64: CPT | Performed by: FAMILY MEDICINE

## 2022-02-24 PROCEDURE — 3077F SYST BP >= 140 MM HG: CPT | Performed by: FAMILY MEDICINE

## 2022-02-24 PROCEDURE — 90471 IMMUNIZATION ADMIN: CPT | Performed by: FAMILY MEDICINE

## 2022-02-24 PROCEDURE — 90715 TDAP VACCINE 7 YRS/> IM: CPT | Performed by: FAMILY MEDICINE

## 2022-02-24 PROCEDURE — 3008F BODY MASS INDEX DOCD: CPT | Performed by: FAMILY MEDICINE

## 2022-02-24 RX ORDER — LISINOPRIL 10 MG/1
10 TABLET ORAL DAILY
Qty: 90 TABLET | Refills: 0 | Status: SHIPPED | OUTPATIENT
Start: 2022-02-24 | End: 2023-02-19

## 2022-02-24 RX ORDER — ROSUVASTATIN CALCIUM 10 MG/1
10 TABLET, COATED ORAL NIGHTLY
Qty: 90 TABLET | Refills: 0 | Status: SHIPPED | OUTPATIENT
Start: 2022-02-24

## 2022-03-01 ENCOUNTER — NURSE ONLY (OUTPATIENT)
Dept: FAMILY MEDICINE CLINIC | Facility: CLINIC | Age: 62
End: 2022-03-01
Payer: COMMERCIAL

## 2022-03-01 DIAGNOSIS — Z12.5 SCREENING FOR MALIGNANT NEOPLASM OF PROSTATE: ICD-10-CM

## 2022-03-01 DIAGNOSIS — Z00.00 HEALTHY ADULT ON ROUTINE PHYSICAL EXAMINATION: Primary | ICD-10-CM

## 2022-03-01 LAB
ALBUMIN SERPL-MCNC: 4.2 G/DL (ref 3.4–5)
ALBUMIN/GLOB SERPL: 1.4 {RATIO} (ref 1–2)
ALP LIVER SERPL-CCNC: 52 U/L
ALT SERPL-CCNC: 38 U/L
ANION GAP SERPL CALC-SCNC: 4 MMOL/L (ref 0–18)
AST SERPL-CCNC: 32 U/L (ref 15–37)
BASOPHILS # BLD AUTO: 0.02 X10(3) UL (ref 0–0.2)
BASOPHILS NFR BLD AUTO: 0.4 %
BILIRUB SERPL-MCNC: 0.6 MG/DL (ref 0.1–2)
BUN BLD-MCNC: 20 MG/DL (ref 7–18)
CALCIUM BLD-MCNC: 9.4 MG/DL (ref 8.5–10.1)
CHLORIDE SERPL-SCNC: 106 MMOL/L (ref 98–112)
CHOLEST SERPL-MCNC: 122 MG/DL (ref ?–200)
CO2 SERPL-SCNC: 29 MMOL/L (ref 21–32)
CREAT BLD-MCNC: 1.1 MG/DL
EOSINOPHIL # BLD AUTO: 0.16 X10(3) UL (ref 0–0.7)
EOSINOPHIL NFR BLD AUTO: 2.9 %
ERYTHROCYTE [DISTWIDTH] IN BLOOD BY AUTOMATED COUNT: 12.8 %
FASTING PATIENT LIPID ANSWER: NO
FASTING STATUS PATIENT QL REPORTED: NO
GLOBULIN PLAS-MCNC: 3.1 G/DL (ref 2.8–4.4)
GLUCOSE BLD-MCNC: 74 MG/DL (ref 70–99)
HCT VFR BLD AUTO: 49.2 %
HDLC SERPL-MCNC: 41 MG/DL (ref 40–59)
HGB BLD-MCNC: 16.5 G/DL
IMM GRANULOCYTES # BLD AUTO: 0.01 X10(3) UL (ref 0–1)
IMM GRANULOCYTES NFR BLD: 0.2 %
LDLC SERPL CALC-MCNC: 68 MG/DL (ref ?–100)
LYMPHOCYTES # BLD AUTO: 1.29 X10(3) UL (ref 1–4)
LYMPHOCYTES NFR BLD AUTO: 23.5 %
MCHC RBC AUTO-ENTMCNC: 33.5 G/DL (ref 31–37)
MCV RBC AUTO: 95.9 FL
MONOCYTES # BLD AUTO: 0.61 X10(3) UL (ref 0.1–1)
MONOCYTES NFR BLD AUTO: 11.1 %
NEUTROPHILS # BLD AUTO: 3.39 X10 (3) UL (ref 1.5–7.7)
NEUTROPHILS # BLD AUTO: 3.39 X10(3) UL (ref 1.5–7.7)
NEUTROPHILS NFR BLD AUTO: 61.9 %
NONHDLC SERPL-MCNC: 81 MG/DL (ref ?–130)
OSMOLALITY SERPL CALC.SUM OF ELEC: 289 MOSM/KG (ref 275–295)
PLATELET # BLD AUTO: 156 10(3)UL (ref 150–450)
POTASSIUM SERPL-SCNC: 5.2 MMOL/L (ref 3.5–5.1)
PROT SERPL-MCNC: 7.3 G/DL (ref 6.4–8.2)
PSA SERPL-MCNC: 1.16 NG/ML (ref ?–4)
RBC # BLD AUTO: 5.13 X10(6)UL
SODIUM SERPL-SCNC: 139 MMOL/L (ref 136–145)
TRIGL SERPL-MCNC: 63 MG/DL (ref 30–149)
VLDLC SERPL CALC-MCNC: 9 MG/DL (ref 0–30)
WBC # BLD AUTO: 5.5 X10(3) UL (ref 4–11)

## 2022-03-01 PROCEDURE — 80061 LIPID PANEL: CPT | Performed by: FAMILY MEDICINE

## 2022-03-01 PROCEDURE — 85025 COMPLETE CBC W/AUTO DIFF WBC: CPT | Performed by: FAMILY MEDICINE

## 2022-03-01 PROCEDURE — 84153 ASSAY OF PSA TOTAL: CPT | Performed by: FAMILY MEDICINE

## 2022-03-01 PROCEDURE — 80053 COMPREHEN METABOLIC PANEL: CPT | Performed by: FAMILY MEDICINE

## 2022-03-01 NOTE — PROGRESS NOTES
Patient presented for lab work ordered by Alejandro Armentautant. One gold and one lavender tubes drawn with a butterfly needle x one atttempt in left ac space. Pt tolerated procedure well.

## 2022-03-08 ENCOUNTER — TELEPHONE (OUTPATIENT)
Dept: FAMILY MEDICINE CLINIC | Facility: CLINIC | Age: 62
End: 2022-03-08

## 2022-03-08 VITALS — SYSTOLIC BLOOD PRESSURE: 127 MMHG | DIASTOLIC BLOOD PRESSURE: 80 MMHG

## 2022-03-08 RX ORDER — ALPRAZOLAM 0.5 MG/1
0.5 TABLET ORAL 3 TIMES DAILY PRN
Qty: 90 TABLET | Refills: 0 | Status: SHIPPED | OUTPATIENT
Start: 2022-03-08

## 2022-03-08 NOTE — TELEPHONE ENCOUNTER
Last OV 2/24/22  Last refilled 2/7/22  #90  0 refills    Confirmed with patient he wants sent to [facility], not [facility]

## 2022-03-08 NOTE — TELEPHONE ENCOUNTER
Entered in flowsheets
Has questions about a vitamin he would like to take      Please adv    Thank you
Left message on voicemail/answering machine for patient to call office
Patient notified and verbalized understanding.
Patient states he is on BP meds. States he takes magnesium and vit D as well. Wanting to know if OK go on Omega 3, 1600 mg BID to help with foot inflammation    Also states BP has been good. States checks TID with home monitor.   Recent /80
Yes, okay to start omega 3's at that dose, that is fine. Pls enter BP in our record.
no discharge, no irritation, no pain, no redness, and no visual changes.

## 2022-03-11 NOTE — IMAGING NOTE
Call placed to pt regarding CTA Gated thoracic on 3/15/2022. Instructed to arrive at 10am. Instructed to drink plenty of fluids a day prior to procedure and day of procedure. May eat a light breakfast/lunch. Advised to hold caffeine 12 hrs prior to procedure.  Voice mail left with call back number

## 2022-03-15 ENCOUNTER — HOSPITAL ENCOUNTER (OUTPATIENT)
Dept: CT IMAGING | Facility: HOSPITAL | Age: 62
Discharge: HOME | End: 2022-03-15
Attending: FAMILY MEDICINE
Payer: COMMERCIAL

## 2022-03-15 VITALS — DIASTOLIC BLOOD PRESSURE: 66 MMHG | HEART RATE: 72 BPM | SYSTOLIC BLOOD PRESSURE: 139 MMHG

## 2022-03-15 DIAGNOSIS — I77.810 DILATION OF THORACIC AORTA (HCC): ICD-10-CM

## 2022-03-15 PROCEDURE — 71275 CT ANGIOGRAPHY CHEST: CPT | Performed by: FAMILY MEDICINE

## 2022-03-15 RX ORDER — IOHEXOL 350 MG/ML
85 INJECTION, SOLUTION INTRAVENOUS
Status: COMPLETED | OUTPATIENT
Start: 2022-03-15 | End: 2022-03-15

## 2022-03-15 RX ADMIN — IOHEXOL 85 ML: 350 INJECTION, SOLUTION INTRAVENOUS at 15:23:00

## 2022-03-17 ENCOUNTER — TELEPHONE (OUTPATIENT)
Dept: FAMILY MEDICINE CLINIC | Facility: CLINIC | Age: 62
End: 2022-03-17

## 2022-03-17 NOTE — TELEPHONE ENCOUNTER
You can let him know that there are two parts to this result, the cardiology portion, which is not back yet.  And, the reading from the radiologist, which is back and normal. Nothing concerning seen in his lungs or other areas of the chest.   I will let him know once I review the cardiolgy portion, which is what were looking for in doing this test.

## 2022-03-17 NOTE — TELEPHONE ENCOUNTER
Pt called to get clarification on his CT results he received via [employer]. Pt states he is okay with receiving a ZALP response.   Thank you

## 2022-03-22 ENCOUNTER — TELEPHONE (OUTPATIENT)
Dept: FAMILY MEDICINE CLINIC | Facility: CLINIC | Age: 62
End: 2022-03-22

## 2022-03-22 NOTE — TELEPHONE ENCOUNTER
Pt is still waiting for rest of results from CT scan he had at [facility]. Please advise when he will receive those results. Thank you!

## 2022-03-22 NOTE — TELEPHONE ENCOUNTER
Spoke with Salazar Shaver in radiology who states it takes 7-14 days for test to be read. Provider can contact Dr Lluvia Flynn directly if needs sooner. Patient notified and verbalized understanding.

## 2022-03-22 NOTE — TELEPHONE ENCOUNTER
It is not back yet. It does take the cardiologists a week or so to read at times. RN: can you call over there in the next few days and check on it?

## 2022-03-24 ENCOUNTER — TELEPHONE (OUTPATIENT)
Dept: FAMILY MEDICINE CLINIC | Facility: CLINIC | Age: 62
End: 2022-03-24

## 2022-03-24 NOTE — TELEPHONE ENCOUNTER
CT scan completed last week. Results on MyChart. Would like someone to call to explain second part of results. Thank you!

## 2022-03-24 NOTE — TELEPHONE ENCOUNTER
Spoke with pt in office. He was here with his wife. 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 Will refer to cardiology for monitoring and medication management. Questions answered. He said BP's at home have been good. Around 120/80's.

## 2022-03-29 ENCOUNTER — ORDER TRANSCRIPTION (OUTPATIENT)
Dept: ADMINISTRATIVE | Facility: HOSPITAL | Age: 62
End: 2022-03-29

## 2022-04-05 RX ORDER — ALPRAZOLAM 0.5 MG/1
0.5 TABLET ORAL 3 TIMES DAILY PRN
Qty: 90 TABLET | Refills: 0 | Status: SHIPPED | OUTPATIENT
Start: 2022-04-05

## 2022-04-13 ENCOUNTER — LAB ENCOUNTER (OUTPATIENT)
Dept: LAB | Age: 62
End: 2022-04-13
Attending: INTERNAL MEDICINE
Payer: COMMERCIAL

## 2022-04-13 DIAGNOSIS — Z11.59 ENCOUNTER FOR SCREENING FOR OTHER VIRAL DISEASES: ICD-10-CM

## 2022-04-13 DIAGNOSIS — Z01.818 PRE-OP TESTING: ICD-10-CM

## 2022-04-14 LAB — SARS-COV-2 RNA RESP QL NAA+PROBE: NOT DETECTED

## 2022-04-15 ENCOUNTER — APPOINTMENT (OUTPATIENT)
Dept: CV DIAGNOSTICS | Facility: HOSPITAL | Age: 62
End: 2022-04-15
Attending: INTERNAL MEDICINE
Payer: COMMERCIAL

## 2022-04-15 ENCOUNTER — HOSPITAL ENCOUNTER (OUTPATIENT)
Dept: CV DIAGNOSTICS | Facility: HOSPITAL | Age: 62
Discharge: HOME | End: 2022-04-15
Attending: INTERNAL MEDICINE
Payer: COMMERCIAL

## 2022-04-15 DIAGNOSIS — E78.00 PURE HYPERCHOLESTEROLEMIA: ICD-10-CM

## 2022-04-15 DIAGNOSIS — I77.810 DILATION OF THORACIC AORTA (HCC): ICD-10-CM

## 2022-04-15 DIAGNOSIS — R93.1 ELEVATED CORONARY ARTERY CALCIUM SCORE: ICD-10-CM

## 2022-04-15 PROCEDURE — 93017 CV STRESS TEST TRACING ONLY: CPT | Performed by: INTERNAL MEDICINE

## 2022-04-15 PROCEDURE — 93306 TTE W/DOPPLER COMPLETE: CPT | Performed by: INTERNAL MEDICINE

## 2022-04-25 ENCOUNTER — TELEPHONE (OUTPATIENT)
Dept: FAMILY MEDICINE CLINIC | Facility: CLINIC | Age: 62
End: 2022-04-25

## 2022-04-25 NOTE — TELEPHONE ENCOUNTER
Patient requests referral  [facility]  Dr Juan Ham  Phone #[phone]  Fax #[phone]    Patient requesting custom shoes      Thank you

## 2022-05-03 RX ORDER — ROSUVASTATIN CALCIUM 10 MG/1
10 TABLET, COATED ORAL NIGHTLY
Qty: 90 TABLET | Refills: 0 | Status: SHIPPED | OUTPATIENT
Start: 2022-05-03

## 2022-05-03 RX ORDER — ALPRAZOLAM 0.5 MG/1
0.5 TABLET ORAL 3 TIMES DAILY PRN
Qty: 90 TABLET | Refills: 0 | Status: SHIPPED | OUTPATIENT
Start: 2022-05-03

## 2022-05-03 RX ORDER — LISINOPRIL 10 MG/1
10 TABLET ORAL DAILY
Qty: 90 TABLET | Refills: 0 | Status: SHIPPED | OUTPATIENT
Start: 2022-05-03 | End: 2023-04-28

## 2022-05-03 NOTE — TELEPHONE ENCOUNTER
Last OV 2/24/22  3/1/22 cmp/Creat 1.10/ALT 38, lipid   Potassium 5.2  Last refilled   4/5/22 alprazolam  #90  0 refills  2/24/22 lisinopril and rosuvastatin #90  0 refills

## 2022-05-03 NOTE — TELEPHONE ENCOUNTER
requests refill    ALPRAZolam 0.5 MG Oral Tab    lisinopril 10 MG Oral Tab    rosuvastatin 10 MG Oral Tab      [facility]

## 2022-05-09 ENCOUNTER — TELEPHONE (OUTPATIENT)
Dept: FAMILY MEDICINE CLINIC | Facility: CLINIC | Age: 62
End: 2022-05-09

## 2022-05-10 ENCOUNTER — MED REC SCAN ONLY (OUTPATIENT)
Dept: FAMILY MEDICINE CLINIC | Facility: CLINIC | Age: 62
End: 2022-05-10

## 2022-05-13 ENCOUNTER — TELEPHONE (OUTPATIENT)
Dept: FAMILY MEDICINE CLINIC | Facility: CLINIC | Age: 62
End: 2022-05-13

## 2022-05-16 ENCOUNTER — NURSE ONLY (OUTPATIENT)
Dept: FAMILY MEDICINE CLINIC | Facility: CLINIC | Age: 62
End: 2022-05-16
Payer: COMMERCIAL

## 2022-05-16 ENCOUNTER — TELEPHONE (OUTPATIENT)
Dept: FAMILY MEDICINE CLINIC | Facility: CLINIC | Age: 62
End: 2022-05-16

## 2022-05-16 DIAGNOSIS — E87.5 HYPERKALEMIA: ICD-10-CM

## 2022-05-16 LAB
ANION GAP SERPL CALC-SCNC: 3 MMOL/L (ref 0–18)
BUN BLD-MCNC: 13 MG/DL (ref 7–18)
CALCIUM BLD-MCNC: 9.1 MG/DL (ref 8.5–10.1)
CHLORIDE SERPL-SCNC: 107 MMOL/L (ref 98–112)
CO2 SERPL-SCNC: 29 MMOL/L (ref 21–32)
CREAT BLD-MCNC: 1.04 MG/DL
FASTING STATUS PATIENT QL REPORTED: NO
GLUCOSE BLD-MCNC: 94 MG/DL (ref 70–99)
OSMOLALITY SERPL CALC.SUM OF ELEC: 288 MOSM/KG (ref 275–295)
POTASSIUM SERPL-SCNC: 4.4 MMOL/L (ref 3.5–5.1)
SODIUM SERPL-SCNC: 139 MMOL/L (ref 136–145)

## 2022-05-16 PROCEDURE — 80048 BASIC METABOLIC PNL TOTAL CA: CPT | Performed by: FAMILY MEDICINE

## 2022-05-16 NOTE — PROGRESS NOTES
Patient to clinic for BMP per KE. Gold tube drawn left AC x 1 attempt.     Patient left office in stable condition

## 2022-05-16 NOTE — TELEPHONE ENCOUNTER
Patient states he was told by cardiology he should only eat fish, fruits and vegetables. States he has buildup in his aorta. States he would like to know what other foods he can have. Patient agreeable to seeing dietitian    Patient also asking if will need to be on BP medication life long and why. Discussed with patient that with build up in the arteries, they are not as elastic. This increases pressure and makes more prone to aneurysms. Important to keep BP controlled. Patient verbalized understanding. Patient also asking about why he needs to recheck potassium. Advised abnormal potassium can cause arrhythmias.  Some medications can affect potassium level so important to monitor if abnormal level noted    Routed to KE to advise on order for dietitian

## 2022-06-06 DIAGNOSIS — F41.9 ANXIETY: ICD-10-CM

## 2022-06-06 RX ORDER — ALPRAZOLAM 0.5 MG/1
0.5 TABLET ORAL 3 TIMES DAILY PRN
Qty: 90 TABLET | Refills: 0 | Status: SHIPPED | OUTPATIENT
Start: 2022-06-06

## 2022-06-06 NOTE — TELEPHONE ENCOUNTER
[facility]/pharmacy #[id number]- Zackary Charlie Saabdionna [phone], [phone]   [facility] [address] [place] [address]   Phone: [phone] Fax: [phone]     ASKING FOR REFILL ON ALPRAZOLAM

## 2022-07-05 DIAGNOSIS — F41.9 ANXIETY: ICD-10-CM

## 2022-07-05 DIAGNOSIS — R03.0 ELEVATED BLOOD PRESSURE READING: ICD-10-CM

## 2022-07-05 DIAGNOSIS — I77.810 DILATION OF THORACIC AORTA (HCC): ICD-10-CM

## 2022-07-05 RX ORDER — ALPRAZOLAM 0.5 MG/1
0.5 TABLET ORAL 3 TIMES DAILY PRN
Qty: 90 TABLET | Refills: 0 | Status: SHIPPED | OUTPATIENT
Start: 2022-07-05 | End: 2022-08-05

## 2022-07-05 RX ORDER — LISINOPRIL 10 MG/1
10 TABLET ORAL DAILY
Qty: 90 TABLET | Refills: 2 | Status: SHIPPED | OUTPATIENT
Start: 2022-07-05 | End: 2023-06-30

## 2022-07-05 RX ORDER — ROSUVASTATIN CALCIUM 10 MG/1
10 TABLET, COATED ORAL NIGHTLY
Qty: 90 TABLET | Refills: 2 | Status: SHIPPED | OUTPATIENT
Start: 2022-07-05

## 2022-07-05 NOTE — TELEPHONE ENCOUNTER
[facility]/pharmacy #[id number]- Zackary Charlie Saabdionna [phone], [address]   Phone: [phone] Fax: [phone]   Hours: Not open 24 hours     Patient requesting refill for his  Alprazolam  Lisinopril  Rosuvastatin

## 2022-07-05 NOTE — TELEPHONE ENCOUNTER
Last OV 2/24/22  Last lab 3/1/22 lipid, CMP/ALT 38  Last refilled:  5/3/22 lisinopril and rosuvastatin #90  0 refills  6/6/22 alprazolam  #90  0 refills

## 2022-07-18 NOTE — TELEPHONE ENCOUNTER
Addended Irineo Resendez on: 7/18/2022 04:22 PM     Modules accepted: Orders No appointment needed. Prescription updated to show every 12 as needed for his Xanax. Please call in.

## 2022-08-05 DIAGNOSIS — F41.9 ANXIETY: ICD-10-CM

## 2022-08-05 RX ORDER — ALPRAZOLAM 0.5 MG/1
0.5 TABLET ORAL 3 TIMES DAILY PRN
Qty: 90 TABLET | Refills: 0 | Status: SHIPPED | OUTPATIENT
Start: 2022-08-05

## 2022-09-07 DIAGNOSIS — F41.9 ANXIETY: ICD-10-CM

## 2022-09-07 RX ORDER — ALPRAZOLAM 0.5 MG/1
0.5 TABLET ORAL 3 TIMES DAILY PRN
Qty: 90 TABLET | Refills: 0 | Status: SHIPPED | OUTPATIENT
Start: 2022-09-07

## 2022-09-07 NOTE — TELEPHONE ENCOUNTER
PT CALLED AND ADV NEEDS REFILL OF     ALPRAZolam 0.5 MG Oral Tab    AND    lisinopril 10 MG Oral Tab    AND    rosuvastatin 10 MG Oral Tab      [facility]     THANK YOU Other (Specify)

## 2022-09-07 NOTE — TELEPHONE ENCOUNTER
LOV: 2/24/22   Last Refill: 8/5/22 390 0 RF    No future appointments. RN spoke with pt- Lisinopril and Rousvastatin were filled form #903 in July.   Pt should have refills at pharmacy    Pt needs refill of Alprazolam

## 2022-10-06 DIAGNOSIS — F41.9 ANXIETY: ICD-10-CM

## 2022-10-06 RX ORDER — ALPRAZOLAM 0.5 MG/1
0.5 TABLET ORAL 3 TIMES DAILY PRN
Qty: 90 TABLET | Refills: 0 | Status: SHIPPED | OUTPATIENT
Start: 2022-10-06

## 2022-10-06 NOTE — TELEPHONE ENCOUNTER
[facility]/pharmacy #[id number]- Jose Fn Charlie Joie [phone], [phone]   [address] [place] [address]   Phone: [phone] Fax: [phone]     PATIENT REQUESTS REFILL ON ALPRAZOLAM

## 2022-11-04 DIAGNOSIS — F41.9 ANXIETY: ICD-10-CM

## 2022-11-04 RX ORDER — ALPRAZOLAM 0.5 MG/1
0.5 TABLET ORAL 3 TIMES DAILY PRN
Qty: 90 TABLET | Refills: 0 | Status: SHIPPED | OUTPATIENT
Start: 2022-11-04

## 2022-12-05 DIAGNOSIS — F41.9 ANXIETY: ICD-10-CM

## 2022-12-05 RX ORDER — ALPRAZOLAM 0.5 MG/1
0.5 TABLET ORAL 3 TIMES DAILY PRN
Qty: 90 TABLET | Refills: 0 | Status: SHIPPED | OUTPATIENT
Start: 2022-12-05

## 2022-12-05 NOTE — TELEPHONE ENCOUNTER
[facility]/pharmacy #[id number]- Tylsarahn Charlie Salter [phone], [phone]   [address] [place] [address],[address] [place] [address]   Phone: [phone] Fax: [phone]     PATIENT REQUESTING REFILL  FOR ALPRAZOLAM. I ADDED THE [facility] IN [place].

## 2023-01-05 DIAGNOSIS — F41.9 ANXIETY: ICD-10-CM

## 2023-01-05 RX ORDER — ALPRAZOLAM 0.5 MG/1
0.5 TABLET ORAL 3 TIMES DAILY PRN
Qty: 90 TABLET | Refills: 0 | Status: SHIPPED | OUTPATIENT
Start: 2023-01-05

## 2023-02-06 DIAGNOSIS — F41.9 ANXIETY: ICD-10-CM

## 2023-02-06 RX ORDER — ALPRAZOLAM 0.5 MG/1
0.5 TABLET ORAL 3 TIMES DAILY PRN
Qty: 90 TABLET | Refills: 0 | Status: SHIPPED | OUTPATIENT
Start: 2023-02-06

## 2023-02-24 ENCOUNTER — OFFICE VISIT (OUTPATIENT)
Dept: FAMILY MEDICINE CLINIC | Facility: CLINIC | Age: 63
End: 2023-02-24
Payer: COMMERCIAL

## 2023-02-24 VITALS
DIASTOLIC BLOOD PRESSURE: 88 MMHG | HEIGHT: 76 IN | SYSTOLIC BLOOD PRESSURE: 128 MMHG | BODY MASS INDEX: 27.76 KG/M2 | OXYGEN SATURATION: 100 % | TEMPERATURE: 97 F | RESPIRATION RATE: 16 BRPM | HEART RATE: 65 BPM | WEIGHT: 228 LBS

## 2023-02-24 DIAGNOSIS — Z12.5 SCREENING FOR MALIGNANT NEOPLASM OF PROSTATE: ICD-10-CM

## 2023-02-24 DIAGNOSIS — I77.810 DILATION OF THORACIC AORTA (HCC): ICD-10-CM

## 2023-02-24 DIAGNOSIS — B35.6 TINEA CRURIS: ICD-10-CM

## 2023-02-24 DIAGNOSIS — Z00.00 HEALTHY ADULT ON ROUTINE PHYSICAL EXAMINATION: Primary | ICD-10-CM

## 2023-02-24 DIAGNOSIS — F41.9 ANXIETY: ICD-10-CM

## 2023-02-24 DIAGNOSIS — R03.0 ELEVATED BLOOD PRESSURE READING: ICD-10-CM

## 2023-02-24 DIAGNOSIS — Z23 NEED FOR VACCINATION: ICD-10-CM

## 2023-02-24 LAB
ALBUMIN SERPL-MCNC: 4.1 G/DL (ref 3.4–5)
ALBUMIN/GLOB SERPL: 1.2 {RATIO} (ref 1–2)
ALP LIVER SERPL-CCNC: 43 U/L
ALT SERPL-CCNC: 47 U/L
ANION GAP SERPL CALC-SCNC: 5 MMOL/L (ref 0–18)
AST SERPL-CCNC: 30 U/L (ref 15–37)
BASOPHILS # BLD AUTO: 0.02 X10(3) UL (ref 0–0.2)
BASOPHILS NFR BLD AUTO: 0.5 %
BILIRUB SERPL-MCNC: 0.5 MG/DL (ref 0.1–2)
BUN BLD-MCNC: 16 MG/DL (ref 7–18)
CALCIUM BLD-MCNC: 9.3 MG/DL (ref 8.5–10.1)
CHLORIDE SERPL-SCNC: 109 MMOL/L (ref 98–112)
CHOLEST SERPL-MCNC: 104 MG/DL (ref ?–200)
CO2 SERPL-SCNC: 27 MMOL/L (ref 21–32)
CREAT BLD-MCNC: 1.05 MG/DL
EOSINOPHIL # BLD AUTO: 0.1 X10(3) UL (ref 0–0.7)
EOSINOPHIL NFR BLD AUTO: 2.4 %
ERYTHROCYTE [DISTWIDTH] IN BLOOD BY AUTOMATED COUNT: 12.3 %
FASTING PATIENT LIPID ANSWER: YES
FASTING STATUS PATIENT QL REPORTED: YES
GFR SERPLBLD BASED ON 1.73 SQ M-ARVRAT: 79 ML/MIN/1.73M2 (ref 60–?)
GLOBULIN PLAS-MCNC: 3.3 G/DL (ref 2.8–4.4)
GLUCOSE BLD-MCNC: 89 MG/DL (ref 70–99)
HCT VFR BLD AUTO: 48.8 %
HDLC SERPL-MCNC: 52 MG/DL (ref 40–59)
HGB BLD-MCNC: 16.3 G/DL
IMM GRANULOCYTES # BLD AUTO: 0.01 X10(3) UL (ref 0–1)
IMM GRANULOCYTES NFR BLD: 0.2 %
LDLC SERPL CALC-MCNC: 40 MG/DL (ref ?–100)
LYMPHOCYTES # BLD AUTO: 1.16 X10(3) UL (ref 1–4)
LYMPHOCYTES NFR BLD AUTO: 27.6 %
MCH RBC QN AUTO: 31.9 PG (ref 26–34)
MCHC RBC AUTO-ENTMCNC: 33.4 G/DL (ref 31–37)
MCV RBC AUTO: 95.5 FL
MONOCYTES # BLD AUTO: 0.47 X10(3) UL (ref 0.1–1)
MONOCYTES NFR BLD AUTO: 11.2 %
NEUTROPHILS # BLD AUTO: 2.44 X10 (3) UL (ref 1.5–7.7)
NEUTROPHILS # BLD AUTO: 2.44 X10(3) UL (ref 1.5–7.7)
NEUTROPHILS NFR BLD AUTO: 58.1 %
NONHDLC SERPL-MCNC: 52 MG/DL (ref ?–130)
OSMOLALITY SERPL CALC.SUM OF ELEC: 293 MOSM/KG (ref 275–295)
PLATELET # BLD AUTO: 158 10(3)UL (ref 150–450)
POTASSIUM SERPL-SCNC: 4.6 MMOL/L (ref 3.5–5.1)
PROT SERPL-MCNC: 7.4 G/DL (ref 6.4–8.2)
RBC # BLD AUTO: 5.11 X10(6)UL
SODIUM SERPL-SCNC: 141 MMOL/L (ref 136–145)
TRIGL SERPL-MCNC: 47 MG/DL (ref 30–149)
VLDLC SERPL CALC-MCNC: 6 MG/DL (ref 0–30)
WBC # BLD AUTO: 4.2 X10(3) UL (ref 4–11)

## 2023-02-24 PROCEDURE — 90471 IMMUNIZATION ADMIN: CPT | Performed by: FAMILY MEDICINE

## 2023-02-24 PROCEDURE — 99396 PREV VISIT EST AGE 40-64: CPT | Performed by: FAMILY MEDICINE

## 2023-02-24 PROCEDURE — 3074F SYST BP LT 130 MM HG: CPT | Performed by: FAMILY MEDICINE

## 2023-02-24 PROCEDURE — 85025 COMPLETE CBC W/AUTO DIFF WBC: CPT | Performed by: FAMILY MEDICINE

## 2023-02-24 PROCEDURE — 90677 PCV20 VACCINE IM: CPT | Performed by: FAMILY MEDICINE

## 2023-02-24 PROCEDURE — 80061 LIPID PANEL: CPT | Performed by: FAMILY MEDICINE

## 2023-02-24 PROCEDURE — 3079F DIAST BP 80-89 MM HG: CPT | Performed by: FAMILY MEDICINE

## 2023-02-24 PROCEDURE — 3008F BODY MASS INDEX DOCD: CPT | Performed by: FAMILY MEDICINE

## 2023-02-24 PROCEDURE — 80053 COMPREHEN METABOLIC PANEL: CPT | Performed by: FAMILY MEDICINE

## 2023-02-24 RX ORDER — LISINOPRIL 10 MG/1
10 TABLET ORAL DAILY
Qty: 90 TABLET | Refills: 3 | Status: SHIPPED | OUTPATIENT
Start: 2023-02-24 | End: 2024-02-19

## 2023-02-24 RX ORDER — CLOTRIMAZOLE AND BETAMETHASONE DIPROPIONATE 10; .64 MG/G; MG/G
1 CREAM TOPICAL 2 TIMES DAILY
Qty: 45 G | Refills: 0 | Status: SHIPPED | OUTPATIENT
Start: 2023-02-24 | End: 2023-03-10

## 2023-02-24 RX ORDER — ROSUVASTATIN CALCIUM 10 MG/1
10 TABLET, COATED ORAL NIGHTLY
Qty: 90 TABLET | Refills: 3 | Status: SHIPPED | OUTPATIENT
Start: 2023-02-24

## 2023-03-08 DIAGNOSIS — F41.9 ANXIETY: ICD-10-CM

## 2023-03-08 NOTE — TELEPHONE ENCOUNTER
PT CALLED AND ADV NEEDS REFILL OF     ALPRAZolam 0.5 MG Oral Tab    [facility]    ** PT AWARE  OUT OF OFFICE - OK TO WAIT UNTIL TOMORROW **       THANK YOU

## 2023-03-09 RX ORDER — ALPRAZOLAM 0.5 MG/1
0.5 TABLET ORAL 3 TIMES DAILY PRN
Qty: 90 TABLET | Refills: 0 | Status: SHIPPED | OUTPATIENT
Start: 2023-03-09

## 2023-04-05 DIAGNOSIS — F41.9 ANXIETY: ICD-10-CM

## 2023-04-05 RX ORDER — ALPRAZOLAM 0.5 MG/1
0.5 TABLET ORAL 3 TIMES DAILY PRN
Qty: 90 TABLET | Refills: 0 | Status: SHIPPED | OUTPATIENT
Start: 2023-04-05

## 2023-04-05 NOTE — TELEPHONE ENCOUNTER
Last OV:02/04/2023  Last refill:03/09/2023, 90 tabs, 0 refill     Medication pended, please sign if appropriate

## 2023-04-05 NOTE — TELEPHONE ENCOUNTER
Pt needs a refill of:    ALPRAZolam 0.5 MG Oral Tab [[id number]] (Order [id number]    Please send to:    [facility]/pharmacy #[id number]- Ariana Arias - [address] [facility] [phone], [phone]   [address] [place] [address]   Phone: [phone] Fax: [phone]     Thank nell u!

## 2023-04-06 ENCOUNTER — MED REC SCAN ONLY (OUTPATIENT)
Dept: FAMILY MEDICINE CLINIC | Facility: CLINIC | Age: 63
End: 2023-04-06

## 2023-05-08 DIAGNOSIS — F41.9 ANXIETY: ICD-10-CM

## 2023-05-08 RX ORDER — ALPRAZOLAM 0.5 MG/1
0.5 TABLET ORAL 3 TIMES DAILY PRN
Qty: 90 TABLET | Refills: 0 | Status: SHIPPED | OUTPATIENT
Start: 2023-05-08

## 2023-05-08 NOTE — TELEPHONE ENCOUNTER
Last OV 2/24/23  Last refilled 4/5/23 #90  0 refill
Pt needs a refill of:    ALPRAZolam 0.5 MG Oral Tab [[id number]] (Order [id number])    Please send to:  [facility]/pharmacy #[id number]- Yana Bglwf - [address] [facility] [place] [phone], [phone]   [address] [place] [address]   Phone: [phone] Fax: [phone]     Thank you!
Script sent.
Stable

## 2023-05-11 ENCOUNTER — OFFICE VISIT (OUTPATIENT)
Dept: FAMILY MEDICINE CLINIC | Facility: CLINIC | Age: 63
End: 2023-05-11
Payer: COMMERCIAL

## 2023-05-11 VITALS
SYSTOLIC BLOOD PRESSURE: 122 MMHG | DIASTOLIC BLOOD PRESSURE: 80 MMHG | HEART RATE: 61 BPM | WEIGHT: 221.38 LBS | BODY MASS INDEX: 27 KG/M2 | RESPIRATION RATE: 18 BRPM | TEMPERATURE: 97 F | OXYGEN SATURATION: 99 %

## 2023-05-11 DIAGNOSIS — I83.813 VARICOSE VEINS OF BOTH LOWER EXTREMITIES WITH PAIN: Primary | ICD-10-CM

## 2023-05-11 PROCEDURE — 3074F SYST BP LT 130 MM HG: CPT | Performed by: FAMILY MEDICINE

## 2023-05-11 PROCEDURE — 3079F DIAST BP 80-89 MM HG: CPT | Performed by: FAMILY MEDICINE

## 2023-05-11 PROCEDURE — 99214 OFFICE O/P EST MOD 30 MIN: CPT | Performed by: FAMILY MEDICINE

## 2023-05-23 ENCOUNTER — OFFICE VISIT (OUTPATIENT)
Dept: FAMILY MEDICINE CLINIC | Facility: CLINIC | Age: 63
End: 2023-05-23
Payer: COMMERCIAL

## 2023-05-23 ENCOUNTER — TELEPHONE (OUTPATIENT)
Dept: FAMILY MEDICINE CLINIC | Facility: CLINIC | Age: 63
End: 2023-05-23

## 2023-05-23 VITALS
WEIGHT: 230 LBS | TEMPERATURE: 97 F | SYSTOLIC BLOOD PRESSURE: 122 MMHG | RESPIRATION RATE: 18 BRPM | BODY MASS INDEX: 28 KG/M2 | OXYGEN SATURATION: 98 % | HEART RATE: 67 BPM | DIASTOLIC BLOOD PRESSURE: 76 MMHG

## 2023-05-23 DIAGNOSIS — G62.9 NEUROPATHY: ICD-10-CM

## 2023-05-23 DIAGNOSIS — R20.2 NUMBNESS AND TINGLING OF FOOT: Primary | ICD-10-CM

## 2023-05-23 DIAGNOSIS — Z98.890 HISTORY OF FOOT SURGERY: ICD-10-CM

## 2023-05-23 DIAGNOSIS — R20.0 NUMBNESS AND TINGLING OF FOOT: Primary | ICD-10-CM

## 2023-05-23 LAB
ANION GAP SERPL CALC-SCNC: 2 MMOL/L (ref 0–18)
BUN BLD-MCNC: 17 MG/DL (ref 7–18)
CALCIUM BLD-MCNC: 9.3 MG/DL (ref 8.5–10.1)
CHLORIDE SERPL-SCNC: 108 MMOL/L (ref 98–112)
CO2 SERPL-SCNC: 29 MMOL/L (ref 21–32)
CREAT BLD-MCNC: 1.24 MG/DL
EST. AVERAGE GLUCOSE BLD GHB EST-MCNC: 114 MG/DL (ref 68–126)
FASTING STATUS PATIENT QL REPORTED: NO
FOLATE SERPL-MCNC: 19.1 NG/ML (ref 8.7–?)
GFR SERPLBLD BASED ON 1.73 SQ M-ARVRAT: 65 ML/MIN/1.73M2 (ref 60–?)
GLUCOSE BLD-MCNC: 96 MG/DL (ref 70–99)
HBA1C MFR BLD: 5.6 % (ref ?–5.7)
OSMOLALITY SERPL CALC.SUM OF ELEC: 289 MOSM/KG (ref 275–295)
POTASSIUM SERPL-SCNC: 4.8 MMOL/L (ref 3.5–5.1)
SODIUM SERPL-SCNC: 139 MMOL/L (ref 136–145)
TSI SER-ACNC: 3.23 MIU/ML (ref 0.36–3.74)
VIT B12 SERPL-MCNC: 381 PG/ML (ref 193–986)

## 2023-05-23 PROCEDURE — 83036 HEMOGLOBIN GLYCOSYLATED A1C: CPT | Performed by: FAMILY MEDICINE

## 2023-05-23 PROCEDURE — 3078F DIAST BP <80 MM HG: CPT | Performed by: FAMILY MEDICINE

## 2023-05-23 PROCEDURE — 82607 VITAMIN B-12: CPT | Performed by: FAMILY MEDICINE

## 2023-05-23 PROCEDURE — 84443 ASSAY THYROID STIM HORMONE: CPT | Performed by: FAMILY MEDICINE

## 2023-05-23 PROCEDURE — 82746 ASSAY OF FOLIC ACID SERUM: CPT | Performed by: FAMILY MEDICINE

## 2023-05-23 PROCEDURE — 80048 BASIC METABOLIC PNL TOTAL CA: CPT | Performed by: FAMILY MEDICINE

## 2023-05-23 PROCEDURE — 99214 OFFICE O/P EST MOD 30 MIN: CPT | Performed by: FAMILY MEDICINE

## 2023-05-23 PROCEDURE — 3074F SYST BP LT 130 MM HG: CPT | Performed by: FAMILY MEDICINE

## 2023-05-23 RX ORDER — GABAPENTIN 100 MG/1
100 CAPSULE ORAL 3 TIMES DAILY
Qty: 90 CAPSULE | Refills: 0 | Status: SHIPPED | OUTPATIENT
Start: 2023-05-23

## 2023-05-26 ENCOUNTER — MED REC SCAN ONLY (OUTPATIENT)
Dept: FAMILY MEDICINE CLINIC | Facility: CLINIC | Age: 63
End: 2023-05-26

## 2023-05-26 ENCOUNTER — TELEPHONE (OUTPATIENT)
Dept: FAMILY MEDICINE CLINIC | Facility: CLINIC | Age: 63
End: 2023-05-26

## 2023-05-26 NOTE — TELEPHONE ENCOUNTER
Rceived fax for [facility] records from [place]; copy made and sent to scaning    Original sent to scan stat

## 2023-06-06 DIAGNOSIS — F41.9 ANXIETY: ICD-10-CM

## 2023-06-06 RX ORDER — ALPRAZOLAM 0.5 MG/1
0.5 TABLET ORAL 3 TIMES DAILY PRN
Qty: 90 TABLET | Refills: 0 | Status: SHIPPED | OUTPATIENT
Start: 2023-06-06

## 2023-06-06 NOTE — TELEPHONE ENCOUNTER
Spoke with patient and advised KE refilled lisinopril and rosuvastatin x 1 year in Feb.  Patient will contact pharmacy for those. States he just needs Alprazolam refilled.     Last OV 5/23/23  Last refilled 5/8/23  #90  0 refills

## 2023-06-06 NOTE — TELEPHONE ENCOUNTER
[facility]/pharmacy #[id number]- Zackary Charlie Saabdionna [phone], [address] [facility] [address] [place] [address]   Phone: [phone] Fax: [phone]   Hours: Not open 24 hours     PATIENT REQUESTS REFILL FOR VIT B12, LISINOPRIL 10MG AND ROSUVASTATIN 10MG

## 2023-06-14 ENCOUNTER — TELEPHONE (OUTPATIENT)
Dept: FAMILY MEDICINE CLINIC | Facility: CLINIC | Age: 63
End: 2023-06-14

## 2023-06-14 NOTE — TELEPHONE ENCOUNTER
FYI:    PT CALLED AND ADV THAT HE WAS REFERRED TO [facility]. WAS ADV THAT THEY HAD ORDERED NEW ORTHOTICS. PT ADV THAT THEY WILL NOT COME IN FOR ANOTHER WEEK AND WILL NOT BE ABLE TO RETURN TO WORK UNTIL 6/27. Future Appointments   Date Time Provider Sunni Huerta   6/26/2023  8:00 AM Kareen Marroquin DO [facility]     PT WILL CALL [facility] TO SEE IF THEY CAN SEND NEW PAPER WORK TO BE FILLED OUT.

## 2023-06-16 ENCOUNTER — TELEPHONE (OUTPATIENT)
Dept: FAMILY MEDICINE CLINIC | Facility: CLINIC | Age: 63
End: 2023-06-16

## 2023-06-16 NOTE — TELEPHONE ENCOUNTER
LMOA TO ADV THAT DR TUTTLE HAS NOT COMPLETED STD PAPERWORK - ACCORDING TO PAPERWORK, DOES NOT NEED TO BE SENT UNTIL 7/2/23.  NEEDS OFFICE NOTES TO UPDATE [facility].

## 2023-06-20 ENCOUNTER — OFFICE VISIT (OUTPATIENT)
Dept: FAMILY MEDICINE CLINIC | Facility: CLINIC | Age: 63
End: 2023-06-20
Payer: COMMERCIAL

## 2023-06-20 ENCOUNTER — TELEPHONE (OUTPATIENT)
Dept: FAMILY MEDICINE CLINIC | Facility: CLINIC | Age: 63
End: 2023-06-20

## 2023-06-20 VITALS
OXYGEN SATURATION: 98 % | TEMPERATURE: 98 F | WEIGHT: 231 LBS | DIASTOLIC BLOOD PRESSURE: 72 MMHG | SYSTOLIC BLOOD PRESSURE: 128 MMHG | HEART RATE: 87 BPM | RESPIRATION RATE: 20 BRPM | BODY MASS INDEX: 28 KG/M2

## 2023-06-20 DIAGNOSIS — R20.0 NUMBNESS AND TINGLING OF FOOT: Primary | ICD-10-CM

## 2023-06-20 DIAGNOSIS — R20.2 NUMBNESS AND TINGLING OF FOOT: Primary | ICD-10-CM

## 2023-06-20 DIAGNOSIS — G62.9 NEUROPATHY: ICD-10-CM

## 2023-06-20 PROCEDURE — 3078F DIAST BP <80 MM HG: CPT | Performed by: FAMILY MEDICINE

## 2023-06-20 PROCEDURE — 3074F SYST BP LT 130 MM HG: CPT | Performed by: FAMILY MEDICINE

## 2023-06-20 PROCEDURE — 99214 OFFICE O/P EST MOD 30 MIN: CPT | Performed by: FAMILY MEDICINE

## 2023-07-07 DIAGNOSIS — F41.9 ANXIETY: ICD-10-CM

## 2023-07-07 RX ORDER — ALPRAZOLAM 0.5 MG/1
0.5 TABLET ORAL 3 TIMES DAILY PRN
Qty: 90 TABLET | Refills: 0 | Status: SHIPPED | OUTPATIENT
Start: 2023-07-07

## 2023-08-08 DIAGNOSIS — F41.9 ANXIETY: ICD-10-CM

## 2023-08-08 RX ORDER — ALPRAZOLAM 0.5 MG/1
0.5 TABLET ORAL 3 TIMES DAILY PRN
Qty: 90 TABLET | Refills: 0 | Status: SHIPPED | OUTPATIENT
Start: 2023-08-08

## 2023-09-05 DIAGNOSIS — F41.9 ANXIETY: ICD-10-CM

## 2023-09-05 RX ORDER — ALPRAZOLAM 0.5 MG/1
0.5 TABLET ORAL 3 TIMES DAILY PRN
Qty: 90 TABLET | Refills: 0 | Status: SHIPPED | OUTPATIENT
Start: 2023-09-05

## 2023-11-03 DIAGNOSIS — F41.9 ANXIETY: ICD-10-CM

## 2023-11-03 RX ORDER — ALPRAZOLAM 0.5 MG/1
0.5 TABLET ORAL 3 TIMES DAILY PRN
Qty: 90 TABLET | Refills: 0 | Status: SHIPPED | OUTPATIENT
Start: 2023-11-03

## 2023-11-04 ENCOUNTER — TELEPHONE (OUTPATIENT)
Dept: FAMILY MEDICINE CLINIC | Facility: CLINIC | Age: 63
End: 2023-11-04

## 2023-11-04 NOTE — TELEPHONE ENCOUNTER
Sent to pharmacy as: ALPRAZolam 0.5 MG Oral Tablet (Xanax)    E-Prescribing Status: Receipt confirmed by pharmacy (11/3/2023  2:09 PM CDT)        Advised patient of above. Patient verbalized understanding. No further questions at this time.

## 2023-12-01 DIAGNOSIS — F41.9 ANXIETY: ICD-10-CM

## 2023-12-01 RX ORDER — ALPRAZOLAM 0.5 MG/1
0.5 TABLET ORAL 3 TIMES DAILY PRN
Qty: 90 TABLET | Refills: 0 | Status: SHIPPED | OUTPATIENT
Start: 2023-12-01

## 2023-12-01 NOTE — TELEPHONE ENCOUNTER
[facility]/pharmacy #[id number]- Zackary Charlie Salter [phone], [phone]   [address] [facility] [address]   Phone: [phone] Fax: [phone]       PATIENT REQUESTING REFILL ON ALPRAZOLAM 0.5MG #90 AND ASKING FOR REFILLS IF POSSIBLE.

## 2023-12-18 ENCOUNTER — OFFICE VISIT (OUTPATIENT)
Dept: FAMILY MEDICINE CLINIC | Facility: CLINIC | Age: 63
End: 2023-12-18
Payer: COMMERCIAL

## 2023-12-18 VITALS
TEMPERATURE: 98 F | DIASTOLIC BLOOD PRESSURE: 72 MMHG | BODY MASS INDEX: 28 KG/M2 | SYSTOLIC BLOOD PRESSURE: 128 MMHG | RESPIRATION RATE: 20 BRPM | HEART RATE: 78 BPM | OXYGEN SATURATION: 98 % | WEIGHT: 227.38 LBS

## 2023-12-18 DIAGNOSIS — R03.0 ELEVATED BLOOD PRESSURE READING: ICD-10-CM

## 2023-12-18 DIAGNOSIS — I77.810 DILATION OF THORACIC AORTA (HCC): ICD-10-CM

## 2023-12-18 DIAGNOSIS — G47.00 INSOMNIA, UNSPECIFIED TYPE: Primary | ICD-10-CM

## 2023-12-18 PROBLEM — R00.1 BRADYCARDIA: Status: ACTIVE | Noted: 2022-01-27

## 2023-12-18 PROCEDURE — 3078F DIAST BP <80 MM HG: CPT | Performed by: FAMILY MEDICINE

## 2023-12-18 PROCEDURE — 3074F SYST BP LT 130 MM HG: CPT | Performed by: FAMILY MEDICINE

## 2023-12-18 PROCEDURE — 99214 OFFICE O/P EST MOD 30 MIN: CPT | Performed by: FAMILY MEDICINE

## 2024-01-02 ENCOUNTER — TELEPHONE (OUTPATIENT)
Dept: FAMILY MEDICINE CLINIC | Facility: CLINIC | Age: 64
End: 2024-01-02

## 2024-01-02 NOTE — TELEPHONE ENCOUNTER
Started getting symptoms Thursday   Congestion, runny nose and cough   Home covid test positive    Needs note to return to work

## 2024-01-03 NOTE — TELEPHONE ENCOUNTER
Pt called to follow up on work note. I informed pt that his request from 1/2/24 was forwarded to Dr. Marroquin. Pt informed that Dr. Marroquin will not be in the office until tomorrow, 1/4/24, and will review her messages throughout her shift tomorrow.     Pt asked if he could receive the note early so he could return to work Thursday, 1/4/24. I informed pt that Dr. Marroquin is not in the office to receive her messages until tomorrow and will address it as soon as possible.     Pt expressed understanding and will await msg from PCP.    Once note has been sent, please call pt

## 2024-01-04 DIAGNOSIS — F41.9 ANXIETY: ICD-10-CM

## 2024-01-04 RX ORDER — ALPRAZOLAM 0.5 MG/1
0.5 TABLET ORAL 3 TIMES DAILY PRN
Qty: 90 TABLET | Refills: 0 | Status: SHIPPED | OUTPATIENT
Start: 2024-01-04

## 2024-01-04 NOTE — TELEPHONE ENCOUNTER
Pt asking for note prior to 1:00pm today- work uses a points system and pt is worried he may be termed if not back at work by 2:30pm.     Pt asking if it is possible to come in and  the note prior to his shift?     Please advise, and should note be printed please call pt to notify him.     Thank you!

## 2024-02-05 DIAGNOSIS — F41.9 ANXIETY: ICD-10-CM

## 2024-02-05 RX ORDER — ALPRAZOLAM 0.5 MG/1
0.5 TABLET ORAL 3 TIMES DAILY PRN
Qty: 90 TABLET | Refills: 0 | Status: SHIPPED | OUTPATIENT
Start: 2024-02-05

## 2024-02-05 NOTE — TELEPHONE ENCOUNTER
Pt requesting refill for alprazolam 0.5 mg to be sent to the [facility] in [place] off [facility].    Please advise pt, thank you!    LOV: 12/18/23

## 2024-02-24 DIAGNOSIS — R03.0 ELEVATED BLOOD PRESSURE READING: ICD-10-CM

## 2024-02-24 DIAGNOSIS — I77.810 DILATION OF THORACIC AORTA (HCC): ICD-10-CM

## 2024-02-24 RX ORDER — LISINOPRIL 10 MG/1
10 TABLET ORAL DAILY
Qty: 90 TABLET | Refills: 3 | Status: SHIPPED | OUTPATIENT
Start: 2024-02-24

## 2024-02-24 NOTE — TELEPHONE ENCOUNTER
Name from pharmacy: LISINOPRIL 10 MG TABLET          Will file in chart as: LISINOPRIL 10 MG Oral Tab    Sig: TAKE 1 TABLET BY MOUTH EVERY DAY    Disp: 90 tablet    Refills: 3    Start: 2/24/2024    Class: Normal    Non-formulary For: Elevated blood pressure reading, Dilation of thoracic aorta (HCC)    Last ordered: 1 year ago (2/24/2023) by Lynn Marroquin DO    Last refill: 11/30/2023    Rx #: [id number]    Hypertension Medications Protocol [id number]/24/2024 07:20 AM   Protocol Details CMP or BMP in past 12 months    Last BP reading less than 140/90    In person appointment or virtual visit in the past 12 mos or appointment in next 3 mos    EGFRCR or GFRNAA > 50      [id number] [place] [place] [facility] [address] [facility] [phone], [phone]     Last refill 2/24/23    LOV 12/18/23    Medication past protocol    Medication sent

## 2024-03-06 DIAGNOSIS — F41.9 ANXIETY: ICD-10-CM

## 2024-03-06 NOTE — TELEPHONE ENCOUNTER
Last OV:12/18/2023  Last refill:02/05/2024, 90 tabs, 0 refill     Medication pended, please sign if appropriate

## 2024-03-07 RX ORDER — ALPRAZOLAM 0.5 MG/1
0.5 TABLET ORAL 3 TIMES DAILY PRN
Qty: 90 TABLET | Refills: 0 | Status: SHIPPED | OUTPATIENT
Start: 2024-03-07

## 2024-03-21 ENCOUNTER — OFFICE VISIT (OUTPATIENT)
Dept: FAMILY MEDICINE CLINIC | Facility: CLINIC | Age: 64
End: 2024-03-21
Payer: COMMERCIAL

## 2024-03-21 VITALS
DIASTOLIC BLOOD PRESSURE: 72 MMHG | WEIGHT: 224 LBS | BODY MASS INDEX: 27 KG/M2 | RESPIRATION RATE: 18 BRPM | SYSTOLIC BLOOD PRESSURE: 130 MMHG | HEART RATE: 87 BPM | OXYGEN SATURATION: 99 % | TEMPERATURE: 98 F

## 2024-03-21 DIAGNOSIS — S39.012A STRAIN OF LUMBAR REGION, INITIAL ENCOUNTER: Primary | ICD-10-CM

## 2024-03-21 DIAGNOSIS — Z98.890 HISTORY OF FOOT SURGERY: ICD-10-CM

## 2024-03-21 DIAGNOSIS — G62.9 NEUROPATHY: ICD-10-CM

## 2024-03-21 PROCEDURE — 99214 OFFICE O/P EST MOD 30 MIN: CPT | Performed by: FAMILY MEDICINE

## 2024-03-21 PROCEDURE — 3075F SYST BP GE 130 - 139MM HG: CPT | Performed by: FAMILY MEDICINE

## 2024-03-21 PROCEDURE — 3078F DIAST BP <80 MM HG: CPT | Performed by: FAMILY MEDICINE

## 2024-03-21 NOTE — PROGRESS NOTES
Sylvester Altamirano is a 63 year old male.  Chief Complaint   Patient presents with    Back Pain     X 1 week     Pain     Feet        HPI:   Tuesday feet were really bothering him. Was off Monday. Feet have hurt in the past, but never this bad. He stayed the whole day.   Called in yesterday.     A week ago, Got a new grill. Went to put it together, bent over and felt something pull in his back.   Went to chiropractor, spine was out of alignments. Doing ice, and adjustments. Following up with her.   No shooting back pain, no radiation. No numbness or tingling.     Would like some time off of work for the foot and back to recover. It is getting better some, but still feels it. Would like to stay off until 4/1/24.   He lifts boxes all day at work, doesn't want to make things worse.     ALLERGIES:  No Known Allergies      Current Outpatient Medications   Medication Sig Dispense Refill    ALPRAZolam 0.5 MG Oral Tab Take 1 tablet (0.5 mg total) by mouth 3 (three) times daily as needed for Anxiety. 90 tablet 0    LISINOPRIL 10 MG Oral Tab TAKE 1 TABLET BY MOUTH EVERY DAY 90 tablet 3    rosuvastatin 10 MG Oral Tab Take 1 tablet (10 mg total) by mouth nightly. 90 tablet 3      Past Medical History:   Diagnosis Date    Abdominal hernia     Anxiety state, unspecified     Contact dermatitis and other eczema, due to unspecified cause     Hearing loss     Hemorrhoids     Wears glasses       Social History:  Social History     Socioeconomic History    Marital status:    Tobacco Use    Smoking status: Never    Smokeless tobacco: Never   Vaping Use    Vaping Use: Never used   Substance and Sexual Activity    Alcohol use: No    Drug use: No   Other Topics Concern    Caffeine Concern No        BP Readings from Last 6 Encounters:   03/21/24 130/72   12/18/23 128/72   06/20/23 128/72   05/23/23 122/76   05/11/23 122/80   02/24/23 128/88       Wt Readings from Last 6 Encounters:   03/21/24 224 lb (101.6 kg)   12/18/23 227 lb 6 oz (103.1  kg)   06/20/23 231 lb (104.8 kg)   05/23/23 230 lb (104.3 kg)   05/11/23 221 lb 6 oz (100.4 kg)   02/24/23 228 lb (103.4 kg)       REVIEW OF SYSTEMS:   GENERAL HEALTH: feels well no complaints  SKIN: denies any unusual skin lesions or rashes  RESPIRATORY: denies shortness of breath    CARDIOVASCULAR: denies chest pain    GI: denies abdominal pain and denies heartburn  NEURO: denies headaches  Musc; see HPI     EXAM:   /72 (BP Location: Left arm, Patient Position: Sitting, Cuff Size: large)   Pulse 87   Temp 98.3 °F (36.8 °C) (Temporal)   Resp 18   Wt 224 lb (101.6 kg)   SpO2 99%   BMI 27.27 kg/m²  Body mass index is 27.27 kg/m².      GENERAL: well developed, well nourished,in no apparent distress  SKIN: no rashes,no suspicious lesions  HEENT: atraumatic, normocephalic   NECK: supple,no adenopathy   LUNGS: clear to auscultation  CARDIO: RRR without murmur  GI: good BS's,no masses, HSM or tenderness  EXTREMITIES: no cyanosis, clubbing or edema  Musc: + tenderness and spasm in left lower back. Feet are not tender to touch, but hurt on ball of feet L>R with standing pressure.     ASSESSMENT AND PLAN:     Encounter Diagnoses   Name Primary?    Strain of lumbar region, initial encounter Yes    Neuropathy     History of foot surgery        Diagnoses and all orders for this visit:    Strain of lumbar region, initial encounter    Neuropathy    History of foot surgery    I agree that going back to work too soon will make issues worse.   Will give him next week off. RTC if not ready to go back on 4/1/24.   Meantime, rest, ice, NSAIDS as needed.     No orders of the defined types were placed in this encounter.              Meds & Refills for this Visit:  Requested Prescriptions      No prescriptions requested or ordered in this encounter             The patient indicates understanding of these issues and agrees to the plan.

## 2024-03-27 ENCOUNTER — TELEPHONE (OUTPATIENT)
Dept: FAMILY MEDICINE CLINIC | Facility: CLINIC | Age: 64
End: 2024-03-27

## 2024-03-27 NOTE — TELEPHONE ENCOUNTER
PT STOPPED IN AND AND DROPPED OFF ADDITIONAL PAPERWORK - NEEDS TO BE FILLED OUT.    PT AWARE DR NOT IN OFFICE AND WILL BE BACK TOMORROW.    MAY TAKE A FEW DAYS FOR PAPERWORK    THANK YOU

## 2024-03-27 NOTE — TELEPHONE ENCOUNTER
See previous work note    Patient requests a modification    \"Return to work/school 4/1/24 with No Restrictions\"    Patient aware  out of office today-ok to wait    Please adv  Thank you

## 2024-03-28 ENCOUNTER — MED REC SCAN ONLY (OUTPATIENT)
Dept: FAMILY MEDICINE CLINIC | Facility: CLINIC | Age: 64
End: 2024-03-28

## 2024-03-28 ENCOUNTER — TELEPHONE (OUTPATIENT)
Dept: FAMILY MEDICINE CLINIC | Facility: CLINIC | Age: 64
End: 2024-03-28

## 2024-03-28 NOTE — TELEPHONE ENCOUNTER
PATIENT CALLING ASKING IF FORM WAS COMPLETED. I INFORMED PATIENT NURSE WAS TRYING TO CALL HIM. WE ADDED HIS NEW PHONE NUMBER. PATIENT SAYS RESTRICTIONS WAS NOT PUT ON LETTER

## 2024-03-28 NOTE — TELEPHONE ENCOUNTER
Form completed.    Per  staff, patient requests completed form faxed to number on form.    Form has been faxed

## 2024-03-28 NOTE — TELEPHONE ENCOUNTER
Attempted to contact patient and cell number on HIPAA form but after 12 rings there was no answer or voicemail.  Left message on home number on form for patient to call office    Copy sent to scanning

## 2024-03-29 ENCOUNTER — TELEPHONE (OUTPATIENT)
Dept: FAMILY MEDICINE CLINIC | Facility: CLINIC | Age: 64
End: 2024-03-29

## 2024-04-05 DIAGNOSIS — F41.9 ANXIETY: ICD-10-CM

## 2024-04-05 NOTE — TELEPHONE ENCOUNTER
[id number] [place] [place] - [address] [phone], [phone]   [address] [place] [address]   Phone: [phone] Fax: [phone]   Hours: Not open 24 hours       PATIENT REQUESTING REFILL FOR ALPRAZOLAM 0.5MG TID

## 2024-04-06 RX ORDER — ALPRAZOLAM 0.5 MG/1
0.5 TABLET ORAL 3 TIMES DAILY PRN
Qty: 90 TABLET | Refills: 0 | Status: SHIPPED | OUTPATIENT
Start: 2024-04-06

## 2024-04-15 ENCOUNTER — MED REC SCAN ONLY (OUTPATIENT)
Dept: FAMILY MEDICINE CLINIC | Facility: CLINIC | Age: 64
End: 2024-04-15

## 2024-04-19 ENCOUNTER — OFFICE VISIT (OUTPATIENT)
Dept: FAMILY MEDICINE CLINIC | Facility: CLINIC | Age: 64
End: 2024-04-19
Payer: COMMERCIAL

## 2024-04-19 VITALS
BODY MASS INDEX: 27 KG/M2 | SYSTOLIC BLOOD PRESSURE: 126 MMHG | TEMPERATURE: 97 F | RESPIRATION RATE: 18 BRPM | WEIGHT: 223.63 LBS | DIASTOLIC BLOOD PRESSURE: 80 MMHG | HEART RATE: 65 BPM | OXYGEN SATURATION: 99 %

## 2024-04-19 DIAGNOSIS — I83.813 VARICOSE VEINS OF BOTH LOWER EXTREMITIES WITH PAIN: Primary | ICD-10-CM

## 2024-04-19 DIAGNOSIS — M79.662 PAIN OF LEFT CALF: ICD-10-CM

## 2024-04-19 PROCEDURE — 3079F DIAST BP 80-89 MM HG: CPT | Performed by: FAMILY MEDICINE

## 2024-04-19 PROCEDURE — 3074F SYST BP LT 130 MM HG: CPT | Performed by: FAMILY MEDICINE

## 2024-04-19 PROCEDURE — 99214 OFFICE O/P EST MOD 30 MIN: CPT | Performed by: FAMILY MEDICINE

## 2024-04-19 NOTE — PROGRESS NOTES
Sylvester Altamirano is a 63 year old male.  Chief Complaint   Patient presents with    Leg Pain     Left leg pain for couple months, pain in ankle to the kneecap.       HPI:   Left ankle pain, dull pain mainly sleeping at night.     The past could of days he has dull pain all the way up into knee up the front of the left leg.   Last night was it very little, but prior to the he couldn't sleep.   No redness or swelling.   No numbness or tingling that is new, just in his feet like he's had before.   He can't think of anything he did that might have made it worse.     Wondering about getting veins treated. He has pain in the calf.     ALLERGIES:  No Known Allergies      Current Outpatient Medications   Medication Sig Dispense Refill    ALPRAZolam 0.5 MG Oral Tab Take 1 tablet (0.5 mg total) by mouth 3 (three) times daily as needed for Anxiety. 90 tablet 0    LISINOPRIL 10 MG Oral Tab TAKE 1 TABLET BY MOUTH EVERY DAY 90 tablet 3    rosuvastatin 10 MG Oral Tab Take 1 tablet (10 mg total) by mouth nightly. 90 tablet 3      Past Medical History:    Abdominal hernia    Anxiety state, unspecified    Contact dermatitis and other eczema, due to unspecified cause    Hearing loss    Hemorrhoids    Wears glasses      Social History:  Social History     Socioeconomic History    Marital status:    Tobacco Use    Smoking status: Never    Smokeless tobacco: Never   Vaping Use    Vaping status: Never Used   Substance and Sexual Activity    Alcohol use: No    Drug use: No   Other Topics Concern    Caffeine Concern No        BP Readings from Last 6 Encounters:   04/19/24 126/80   03/21/24 130/72   12/18/23 128/72   06/20/23 128/72   05/23/23 122/76   05/11/23 122/80       Wt Readings from Last 6 Encounters:   04/19/24 223 lb 9.6 oz (101.4 kg)   03/21/24 224 lb (101.6 kg)   12/18/23 227 lb 6 oz (103.1 kg)   06/20/23 231 lb (104.8 kg)   05/23/23 230 lb (104.3 kg)   05/11/23 221 lb 6 oz (100.4 kg)       REVIEW OF SYSTEMS:   GENERAL HEALTH:  feels well no complaints  SKIN: denies any unusual skin lesions or rashes  RESPIRATORY: denies shortness of breath with exertion  CARDIOVASCULAR: denies chest pain on exertion  GI: denies abdominal pain and denies heartburn  NEURO: denies headaches    EXAM:   /80   Pulse 65   Temp 97.4 °F (36.3 °C)   Resp 18   Wt 223 lb 9.6 oz (101.4 kg)   SpO2 99%   BMI 27.22 kg/m²  Body mass index is 27.22 kg/m².      GENERAL: well developed, well nourished,in no apparent distress  SKIN: no rashes,no suspicious lesions  HEENT: atraumatic, normocephalic,   NECK: supple,no adenopathy   LUNGS: clear to auscultation  CARDIO: RRR without murmur  GI: good BS's,no masses, HSM or tenderness  EXTREMITIES: no cyanosis, clubbing or edema, no swelling or pain on exam. + varicose veins on b/l LE R>L.     ASSESSMENT AND PLAN:     Encounter Diagnoses   Name Primary?    Varicose veins of both lower extremities with pain Yes    Pain of left calf        Diagnoses and all orders for this visit:    Varicose veins of both lower extremities with pain    Pain of left calf    Likely aching from veins.   Recommended compression stockings and will refer him to vein clinic to address further treatment.     No orders of the defined types were placed in this encounter.              Meds & Refills for this Visit:  Requested Prescriptions      No prescriptions requested or ordered in this encounter             The patient indicates understanding of these issues and agrees to the plan.

## 2024-04-19 NOTE — PATIENT INSTRUCTIONS
On [employer] for compression stockings:     In the search box type in:     Medical Grade Compression Stockings 15-20 mmHg Knee High.    [employer] will populate medical grade thigh high compression stockings. Look for the one you like.    Once you select a pair of compressions that you like, go through the pictures and there you will find a picture on how to measure yourself and a chart to input those numbers.         [facility] [place], [place]  Phone (appointments): [phone]  Phone (general inquiries): [phone]  Address: [address] [place] [address]  [facility] [place], [place]  Phone (appointments): [phone]  Phone (general inquiries): [phone]  Address: [address] [place] [address]  [place], [place]  Phone (appointments): [phone]  Phone (general inquiries): [phone]  Address: [address] [place] [address]

## 2024-05-03 ENCOUNTER — OFFICE VISIT (OUTPATIENT)
Dept: FAMILY MEDICINE CLINIC | Facility: CLINIC | Age: 64
End: 2024-05-03
Payer: COMMERCIAL

## 2024-05-03 VITALS
HEIGHT: 76 IN | BODY MASS INDEX: 27.03 KG/M2 | DIASTOLIC BLOOD PRESSURE: 80 MMHG | HEART RATE: 55 BPM | TEMPERATURE: 98 F | OXYGEN SATURATION: 98 % | RESPIRATION RATE: 18 BRPM | SYSTOLIC BLOOD PRESSURE: 130 MMHG | WEIGHT: 222 LBS

## 2024-05-03 DIAGNOSIS — M25.561 CHRONIC PAIN OF BOTH KNEES: Primary | ICD-10-CM

## 2024-05-03 DIAGNOSIS — M25.562 CHRONIC PAIN OF BOTH KNEES: Primary | ICD-10-CM

## 2024-05-03 DIAGNOSIS — F41.9 ANXIETY: ICD-10-CM

## 2024-05-03 DIAGNOSIS — G89.29 CHRONIC PAIN OF BOTH KNEES: Primary | ICD-10-CM

## 2024-05-03 PROCEDURE — 3075F SYST BP GE 130 - 139MM HG: CPT | Performed by: FAMILY MEDICINE

## 2024-05-03 PROCEDURE — 99214 OFFICE O/P EST MOD 30 MIN: CPT | Performed by: FAMILY MEDICINE

## 2024-05-03 PROCEDURE — 3079F DIAST BP 80-89 MM HG: CPT | Performed by: FAMILY MEDICINE

## 2024-05-03 PROCEDURE — 3008F BODY MASS INDEX DOCD: CPT | Performed by: FAMILY MEDICINE

## 2024-05-03 RX ORDER — ALPRAZOLAM 0.5 MG/1
0.5 TABLET ORAL 3 TIMES DAILY PRN
Qty: 90 TABLET | Refills: 0 | OUTPATIENT
Start: 2024-05-03

## 2024-05-03 RX ORDER — ALPRAZOLAM 0.5 MG/1
0.5 TABLET ORAL 3 TIMES DAILY PRN
Qty: 90 TABLET | Refills: 0 | Status: SHIPPED | OUTPATIENT
Start: 2024-05-03

## 2024-05-03 NOTE — PROGRESS NOTES
Sylvester Altamirano is a 63 year old male.  Chief Complaint   Patient presents with    Leg Pain     C/o bilateral leg pain and knee cap pain x 2 weeks        HPI:   Knee pain, knee cap area. Ongoing, worsening with activity.   Was a  for years. Bends down a lot at work. On his feet all day long. Has h/o foot surgery 7-8 yrs ago. That makes it worse.   Looking for accommodations for work so he can keep working  He talked with his manager.   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   The other 2 hrs he would like to do a sitting/desk job. There are options for that.     ALLERGIES:  No Known Allergies      Current Outpatient Medications   Medication Sig Dispense Refill    ALPRAZolam 0.5 MG Oral Tab Take 1 tablet (0.5 mg total) by mouth 3 (three) times daily as needed for Anxiety. 90 tablet 0    LISINOPRIL 10 MG Oral Tab TAKE 1 TABLET BY MOUTH EVERY DAY 90 tablet 3    rosuvastatin 10 MG Oral Tab Take 1 tablet (10 mg total) by mouth nightly. 90 tablet 3      Past Medical History:    Abdominal hernia    Anxiety state, unspecified    Contact dermatitis and other eczema, due to unspecified cause    Hearing loss    Hemorrhoids    Wears glasses      Social History:  Social History     Socioeconomic History    Marital status:    Tobacco Use    Smoking status: Never    Smokeless tobacco: Never   Vaping Use    Vaping status: Never Used   Substance and Sexual Activity    Alcohol use: No    Drug use: No   Other Topics Concern    Caffeine Concern No        BP Readings from Last 6 Encounters:   05/03/24 130/80   04/19/24 126/80   03/21/24 130/72   12/18/23 128/72   06/20/23 128/72   05/23/23 122/76       Wt Readings from Last 6 Encounters:   05/03/24 222 lb (100.7 kg)   04/19/24 223 lb 9.6 oz (101.4 kg)   03/21/24 224 lb (101.6 kg)   12/18/23 227 lb 6 oz (103.1 kg)   06/20/23 231 lb (104.8 kg)   05/23/23 230 lb (104.3 kg)       REVIEW OF SYSTEMS:   GENERAL HEALTH: feels well no complaints  SKIN: denies any  unusual skin lesions or rashes  RESPIRATORY: denies shortness of breath    CARDIOVASCULAR: denies chest pain    GI: denies abdominal pain and denies heartburn  NEURO: denies headaches    EXAM:   /80   Pulse 55   Temp 97.6 °F (36.4 °C) (Temporal)   Resp 18   Ht 6' 4\" (1.93 m)   Wt 222 lb (100.7 kg)   SpO2 98%   BMI 27.02 kg/m²  Body mass index is 27.02 kg/m².      GENERAL: well developed, well nourished,in no apparent distress  SKIN: no rashes,no suspicious lesions  HEENT: atraumatic, normocephalic   NECK: supple,no adenopathy   LUNGS: clear to auscultation  CARDIO: RRR without murmur  GI: good BS's,no masses, HSM or tenderness  EXTREMITIES: no cyanosis, clubbing or edema  Musc: no tenderness or deformity on exam.     ASSESSMENT AND PLAN:     Encounter Diagnoses   Name Primary?    Chronic pain of both knees Yes    Anxiety        Diagnoses and all orders for this visit:    Chronic pain of both knees    Anxiety  -     ALPRAZolam 0.5 MG Oral Tab; Take 1 tablet (0.5 mg total) by mouth 3 (three) times daily as needed for Anxiety.    Note written for accommodations for knee issues.   If worsening pain, then would need to see ortho.     No orders of the defined types were placed in this encounter.              Meds & Refills for this Visit:  Requested Prescriptions     Signed Prescriptions Disp Refills    ALPRAZolam 0.5 MG Oral Tab 90 tablet 0     Sig: Take 1 tablet (0.5 mg total) by mouth 3 (three) times daily as needed for Anxiety.             The patient indicates understanding of these issues and agrees to the plan.

## 2024-05-03 NOTE — TELEPHONE ENCOUNTER
Pt failed refill protocol for the following reasons:       Name from pharmacy: ALPRAZOLAM 0.5 MG TABLET         Will file in chart as: ALPRAZOLAM 0.5 MG Oral Tab    Sig: Take 1 tablet (0.5 mg total) by mouth 3 (three) times daily as needed for Anxiety.    Disp: 90 tablet    Refills: 0    Start: 5/3/2024    Class: Normal    For: Anxiety    To pharmacy: Not to exceed 5 additional fills before 10/03/2024    Last ordered: 3 weeks ago (4/6/2024) by Lynn Marroquin DO    Last refill: 4/6/2024    Rx #: [id number]    Controlled Substance Medication Yqgywj3905/03/2024 08:23 AM    This medication is a controlled substance - forward to provider to refill      To be filled at: [facility]/pharmacy #[id number] - [place], [place] - [address] [facility] [phone], [phone]        Last refill: 4/6/24  Last appt: 4/19/24  Next appt:   Future Appointments   Date Time Provider Department Center   5/3/2024 11:45 AM Lynn Marroquin DO [facility]         Forward to Dr. Marroquin, please advise on refills. Thank you.

## 2024-05-09 ENCOUNTER — TELEPHONE (OUTPATIENT)
Dept: FAMILY MEDICINE CLINIC | Facility: CLINIC | Age: 64
End: 2024-05-09

## 2024-05-09 NOTE — TELEPHONE ENCOUNTER
Received forms from [place]. Per Dr Marroquin, confirm with patient he would like her to complete.    Spoke with patient who states he will need them completed eventually but to hold off for now. Has until 6/12/24.  Has upcoming appt with Chiropractor and therapist and will call to give Dr Marroquin more information before completing.

## 2024-05-13 ENCOUNTER — OFFICE VISIT (OUTPATIENT)
Dept: FAMILY MEDICINE CLINIC | Facility: CLINIC | Age: 64
End: 2024-05-13
Payer: COMMERCIAL

## 2024-05-13 ENCOUNTER — TELEPHONE (OUTPATIENT)
Dept: FAMILY MEDICINE CLINIC | Facility: CLINIC | Age: 64
End: 2024-05-13

## 2024-05-13 VITALS
OXYGEN SATURATION: 96 % | HEART RATE: 66 BPM | SYSTOLIC BLOOD PRESSURE: 128 MMHG | HEIGHT: 76 IN | DIASTOLIC BLOOD PRESSURE: 82 MMHG | BODY MASS INDEX: 27.4 KG/M2 | WEIGHT: 225 LBS | TEMPERATURE: 98 F | RESPIRATION RATE: 18 BRPM

## 2024-05-13 DIAGNOSIS — I77.810 DILATION OF THORACIC AORTA (HCC): ICD-10-CM

## 2024-05-13 DIAGNOSIS — G89.29 CHRONIC FOOT PAIN, UNSPECIFIED LATERALITY: Primary | ICD-10-CM

## 2024-05-13 DIAGNOSIS — M79.673 CHRONIC FOOT PAIN, UNSPECIFIED LATERALITY: Primary | ICD-10-CM

## 2024-05-13 DIAGNOSIS — I83.813 VARICOSE VEINS OF BOTH LOWER EXTREMITIES WITH PAIN: ICD-10-CM

## 2024-05-13 DIAGNOSIS — Z98.890 HISTORY OF FOOT SURGERY: ICD-10-CM

## 2024-05-13 PROCEDURE — 3008F BODY MASS INDEX DOCD: CPT | Performed by: FAMILY MEDICINE

## 2024-05-13 PROCEDURE — 3074F SYST BP LT 130 MM HG: CPT | Performed by: FAMILY MEDICINE

## 2024-05-13 PROCEDURE — 3079F DIAST BP 80-89 MM HG: CPT | Performed by: FAMILY MEDICINE

## 2024-05-13 PROCEDURE — 99214 OFFICE O/P EST MOD 30 MIN: CPT | Performed by: FAMILY MEDICINE

## 2024-05-13 RX ORDER — ROSUVASTATIN CALCIUM 10 MG/1
10 TABLET, COATED ORAL NIGHTLY
Qty: 90 TABLET | Refills: 3 | Status: SHIPPED | OUTPATIENT
Start: 2024-05-13

## 2024-05-13 NOTE — PROGRESS NOTES
Sylvester Altamirano is a 63 year old male.  Chief Complaint   Patient presents with    Complete Form     Patient here to have FMLA forms completed        HPI:   He went to see the nurse at work, they could not do the accommodation he was hoping for his foot pain where he would work for 2 hrs on feet, then 2 hrs sitting. He cannot stand or walk for more than 2 hrs with out pain. Even at home, he has to take a break when mowing the lawn.   He cannot complete a 4 hr shift due to foot pain.   He was instructed to take a CAIN to deal with this.   His first day off was last Monday 5/6/24. Planning on 12 weeks off to get things treated. Hoping to go back sooner if possible.     He will be starting PT this week for the feet. [facility] in [place] and [place].  He is hoping that will help his pain.   He has been to ortho in the past and has custom orthotics, but he has not been wearing them regularly lately.     Also has leg pain. Has had varicose veins for years. No swelling.   Lets are achy, sharp at times, worse with weather changes. Has to keep moving them, hard to lay still. After getting up from sitting, they are stiff from ankle up to knees. Worse on the left. When raining it's both sides. Feels like he needs to move them constantly to stay comfortable. Cannot sit still for long due to pain in legs.     ALLERGIES:  No Known Allergies      Current Outpatient Medications   Medication Sig Dispense Refill    rosuvastatin 10 MG Oral Tab Take 1 tablet (10 mg total) by mouth nightly. 90 tablet 3    ALPRAZolam 0.5 MG Oral Tab Take 1 tablet (0.5 mg total) by mouth 3 (three) times daily as needed for Anxiety. 90 tablet 0    LISINOPRIL 10 MG Oral Tab TAKE 1 TABLET BY MOUTH EVERY DAY 90 tablet 3      Past Medical History:    Abdominal hernia    Anxiety state, unspecified    Contact dermatitis and other eczema, due to unspecified cause    Hearing loss    Hemorrhoids    Wears glasses      Social History:  Social History     Socioeconomic  History    Marital status:    Tobacco Use    Smoking status: Never    Smokeless tobacco: Never   Vaping Use    Vaping status: Never Used   Substance and Sexual Activity    Alcohol use: No    Drug use: No   Other Topics Concern    Caffeine Concern No        BP Readings from Last 6 Encounters:   05/13/24 128/82   05/03/24 130/80   04/19/24 126/80   03/21/24 130/72   12/18/23 128/72   06/20/23 128/72       Wt Readings from Last 6 Encounters:   05/13/24 225 lb (102.1 kg)   05/03/24 222 lb (100.7 kg)   04/19/24 223 lb 9.6 oz (101.4 kg)   03/21/24 224 lb (101.6 kg)   12/18/23 227 lb 6 oz (103.1 kg)   06/20/23 231 lb (104.8 kg)       REVIEW OF SYSTEMS:   GENERAL HEALTH: feels well no complaints other than above   SKIN: denies any unusual skin lesions or rashes  RESPIRATORY: denies shortness of breath    CARDIOVASCULAR: denies chest pain on exertion  GI: denies abdominal pain and denies heartburn  NEURO: denies headaches or dizziness     EXAM:   /82   Pulse 66   Temp 97.8 °F (36.6 °C)   Resp 18   Ht 6' 4\" (1.93 m)   Wt 225 lb (102.1 kg)   SpO2 96%   BMI 27.39 kg/m²  Body mass index is 27.39 kg/m².      GENERAL: well developed, well nourished,in no apparent distress  SKIN: no rashes,no suspicious lesions  HEENT: atraumatic, normocephalic   NECK: supple,no adenopathy  LUNGS: no distress, speaking in full sentences.   CARDIO: RRR without murmur  GI: good BS's,no masses, HSM or tenderness  EXTREMITIES: no cyanosis, clubbing or edema, + varicose veins on both legs L>R. No swelling.     ASSESSMENT AND PLAN:     Encounter Diagnoses   Name Primary?    Chronic foot pain, unspecified laterality Yes    History of foot surgery     Varicose veins of both lower extremities with pain     Dilation of thoracic aorta (HCC)        Diagnoses and all orders for this visit:    Chronic foot pain, unspecified laterality  -     Podiatry Referral - In Network  -     Physical Therapy Referral - External  - wear orthotics  regularly.   - refer back to podiatry, physical therapy also.   - FMLA forms filled out.   - CAIN from 5/6/24-7/28/24.     History of foot surgery  -     Podiatry Referral - In Network  -     Physical Therapy Referral - External    Varicose veins of both lower extremities with pain  - encouraged to wear compression stockings regularly   - consider RLS as well, can try iron supplement to see if that helps.     Dilation of thoracic aorta (HCC)  -     rosuvastatin 10 MG Oral Tab; Take 1 tablet (10 mg total) by mouth nightly.        No orders of the defined types were placed in this encounter.              Meds & Refills for this Visit:  Requested Prescriptions     Signed Prescriptions Disp Refills    rosuvastatin 10 MG Oral Tab 90 tablet 3     Sig: Take 1 tablet (10 mg total) by mouth nightly.             The patient indicates understanding of these issues and agrees to the plan.

## 2024-06-05 DIAGNOSIS — F41.9 ANXIETY: ICD-10-CM

## 2024-06-05 NOTE — TELEPHONE ENCOUNTER
Last OV:05/13/2024  Last refill:05/03/202490 tabs, 0 refill     Medication pended, please sign if appropriate

## 2024-06-06 RX ORDER — ALPRAZOLAM 0.5 MG/1
0.5 TABLET ORAL 3 TIMES DAILY PRN
Qty: 90 TABLET | Refills: 0 | Status: SHIPPED | OUTPATIENT
Start: 2024-06-06

## 2024-06-18 ENCOUNTER — TELEPHONE (OUTPATIENT)
Dept: FAMILY MEDICINE CLINIC | Facility: CLINIC | Age: 64
End: 2024-06-18

## 2024-06-18 NOTE — TELEPHONE ENCOUNTER
FMLA forms received  Per Dr Marroquin, needs appt to complete    Patient notified and verbalized understanding.     Future Appointments   Date Time Provider Department Center   6/20/2024  9:00 AM Lynn Marroquin DO [facility]        Forms in blue book

## 2024-06-20 ENCOUNTER — TELEPHONE (OUTPATIENT)
Dept: FAMILY MEDICINE CLINIC | Facility: CLINIC | Age: 64
End: 2024-06-20

## 2024-06-20 ENCOUNTER — OFFICE VISIT (OUTPATIENT)
Dept: FAMILY MEDICINE CLINIC | Facility: CLINIC | Age: 64
End: 2024-06-20

## 2024-06-20 VITALS
SYSTOLIC BLOOD PRESSURE: 124 MMHG | DIASTOLIC BLOOD PRESSURE: 72 MMHG | WEIGHT: 226 LBS | BODY MASS INDEX: 28 KG/M2 | OXYGEN SATURATION: 98 % | TEMPERATURE: 97 F | RESPIRATION RATE: 18 BRPM | HEART RATE: 87 BPM

## 2024-06-20 DIAGNOSIS — M79.672 LEFT FOOT PAIN: ICD-10-CM

## 2024-06-20 DIAGNOSIS — Z00.00 HEALTHY ADULT ON ROUTINE PHYSICAL EXAMINATION: Primary | ICD-10-CM

## 2024-06-20 DIAGNOSIS — M79.673 CHRONIC FOOT PAIN, UNSPECIFIED LATERALITY: ICD-10-CM

## 2024-06-20 DIAGNOSIS — G89.29 CHRONIC FOOT PAIN, UNSPECIFIED LATERALITY: ICD-10-CM

## 2024-06-20 DIAGNOSIS — Z12.5 SCREENING FOR MALIGNANT NEOPLASM OF PROSTATE: ICD-10-CM

## 2024-06-20 LAB
ALBUMIN SERPL-MCNC: 4.4 G/DL (ref 3.4–5)
ALBUMIN/GLOB SERPL: 1.4 {RATIO} (ref 1–2)
ALP LIVER SERPL-CCNC: 38 U/L
ALT SERPL-CCNC: 55 U/L
ANION GAP SERPL CALC-SCNC: 7 MMOL/L (ref 0–18)
AST SERPL-CCNC: 26 U/L (ref 15–37)
BASOPHILS # BLD AUTO: 0.02 X10(3) UL (ref 0–0.2)
BASOPHILS NFR BLD AUTO: 0.3 %
BILIRUB SERPL-MCNC: 0.5 MG/DL (ref 0.1–2)
BUN BLD-MCNC: 17 MG/DL (ref 9–23)
CALCIUM BLD-MCNC: 9 MG/DL (ref 8.5–10.1)
CHLORIDE SERPL-SCNC: 106 MMOL/L (ref 98–112)
CHOLEST SERPL-MCNC: 103 MG/DL (ref ?–200)
CO2 SERPL-SCNC: 27 MMOL/L (ref 21–32)
COMPLEXED PSA SERPL-MCNC: 1.12 NG/ML (ref ?–4)
CREAT BLD-MCNC: 1.16 MG/DL
EGFRCR SERPLBLD CKD-EPI 2021: 70 ML/MIN/1.73M2 (ref 60–?)
EOSINOPHIL # BLD AUTO: 0.1 X10(3) UL (ref 0–0.7)
EOSINOPHIL NFR BLD AUTO: 1.7 %
ERYTHROCYTE [DISTWIDTH] IN BLOOD BY AUTOMATED COUNT: 12.4 %
FASTING PATIENT LIPID ANSWER: YES
FASTING STATUS PATIENT QL REPORTED: YES
GLOBULIN PLAS-MCNC: 3.1 G/DL (ref 2.8–4.4)
GLUCOSE BLD-MCNC: 92 MG/DL (ref 70–99)
HCT VFR BLD AUTO: 49.3 %
HDLC SERPL-MCNC: 46 MG/DL (ref 40–59)
HGB BLD-MCNC: 16.7 G/DL
IMM GRANULOCYTES # BLD AUTO: 0.02 X10(3) UL (ref 0–1)
IMM GRANULOCYTES NFR BLD: 0.3 %
LDLC SERPL CALC-MCNC: 43 MG/DL (ref ?–100)
LYMPHOCYTES # BLD AUTO: 1.28 X10(3) UL (ref 1–4)
LYMPHOCYTES NFR BLD AUTO: 22.2 %
MCH RBC QN AUTO: 31.9 PG (ref 26–34)
MCHC RBC AUTO-ENTMCNC: 33.9 G/DL (ref 31–37)
MCV RBC AUTO: 94.1 FL
MONOCYTES # BLD AUTO: 0.48 X10(3) UL (ref 0.1–1)
MONOCYTES NFR BLD AUTO: 8.3 %
NEUTROPHILS # BLD AUTO: 3.87 X10 (3) UL (ref 1.5–7.7)
NEUTROPHILS # BLD AUTO: 3.87 X10(3) UL (ref 1.5–7.7)
NEUTROPHILS NFR BLD AUTO: 67.2 %
NONHDLC SERPL-MCNC: 57 MG/DL (ref ?–130)
OSMOLALITY SERPL CALC.SUM OF ELEC: 291 MOSM/KG (ref 275–295)
PLATELET # BLD AUTO: 156 10(3)UL (ref 150–450)
POTASSIUM SERPL-SCNC: 4.8 MMOL/L (ref 3.5–5.1)
PROT SERPL-MCNC: 7.5 G/DL (ref 6.4–8.2)
RBC # BLD AUTO: 5.24 X10(6)UL
SODIUM SERPL-SCNC: 140 MMOL/L (ref 136–145)
TRIGL SERPL-MCNC: 62 MG/DL (ref 30–149)
VLDLC SERPL CALC-MCNC: 9 MG/DL (ref 0–30)
WBC # BLD AUTO: 5.8 X10(3) UL (ref 4–11)

## 2024-06-20 PROCEDURE — 3078F DIAST BP <80 MM HG: CPT | Performed by: FAMILY MEDICINE

## 2024-06-20 PROCEDURE — 85025 COMPLETE CBC W/AUTO DIFF WBC: CPT | Performed by: FAMILY MEDICINE

## 2024-06-20 PROCEDURE — 99397 PER PM REEVAL EST PAT 65+ YR: CPT | Performed by: FAMILY MEDICINE

## 2024-06-20 PROCEDURE — 80053 COMPREHEN METABOLIC PANEL: CPT | Performed by: FAMILY MEDICINE

## 2024-06-20 PROCEDURE — 80061 LIPID PANEL: CPT | Performed by: FAMILY MEDICINE

## 2024-06-20 PROCEDURE — 3074F SYST BP LT 130 MM HG: CPT | Performed by: FAMILY MEDICINE

## 2024-06-20 PROCEDURE — 84153 ASSAY OF PSA TOTAL: CPT | Performed by: FAMILY MEDICINE

## 2024-06-20 NOTE — TELEPHONE ENCOUNTER
Fmla forms completed and faxed to [facility]    Attempted to contact patient but was unable leave message, voicemail not set up    Sent to scan

## 2024-06-20 NOTE — PROGRESS NOTES
Sylvester Altamirano is a 63 year old male.  Chief Complaint   Patient presents with    Complete Form       HPI:   Foot pain: seeing Dr. Wiley. Has orthotics. Those seem to be helping.   He wrote a prescription for orthotics and was sent to [facility]. He was able to get in in [place] and they were able to order the orthotics. That appointment was next Friday.   He is scheduled to go back and get the orthotics on July 15th.   He wants to go back on July 16th.   He would  go back to his regular part time (4 hr scheduled).   Stopped PT, it was not helping. Still seeing chiropractor.     He has been off work completely due to the foot pain.   He has been off on short term disability. Not workman's comp.     He is fasting for blood work today.     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       ALLERGIES:  No Known Allergies      Current Outpatient Medications   Medication Sig Dispense Refill    ALPRAZolam 0.5 MG Oral Tab Take 1 tablet (0.5 mg total) by mouth 3 (three) times daily as needed for Anxiety. 90 tablet 0    rosuvastatin 10 MG Oral Tab Take 1 tablet (10 mg total) by mouth nightly. 90 tablet 3    LISINOPRIL 10 MG Oral Tab TAKE 1 TABLET BY MOUTH EVERY DAY 90 tablet 3      Past Medical History:    Abdominal hernia    Anxiety state, unspecified    Contact dermatitis and other eczema, due to unspecified cause    Hearing loss    Hemorrhoids    Wears glasses      Social History:  Social History     Socioeconomic History    Marital status:    Tobacco Use    Smoking status: Never    Smokeless tobacco: Never   Vaping Use    Vaping status: Never Used   Substance and Sexual Activity    Alcohol use: No    Drug use: No   Other Topics Concern    Caffeine Concern No     Social Determinants of Health      Received from [facility]    Housing Stability        BP Readings from Last 6 Encounters:   06/20/24 124/72   05/13/24 128/82   05/03/24 130/80   04/19/24 126/80   03/21/24 130/72   12/18/23  128/72       Wt Readings from Last 6 Encounters:   06/20/24 226 lb (102.5 kg)   05/13/24 225 lb (102.1 kg)   05/03/24 222 lb (100.7 kg)   04/19/24 223 lb 9.6 oz (101.4 kg)   03/21/24 224 lb (101.6 kg)   12/18/23 227 lb 6 oz (103.1 kg)       REVIEW OF SYSTEMS:   GENERAL HEALTH: feels well no complaints  SKIN: denies any unusual skin lesions or rashes  RESPIRATORY: denies shortness of breath    CARDIOVASCULAR: denies chest pain    GI: denies abdominal pain and denies heartburn  NEURO: denies headaches    EXAM:   /72 (BP Location: Right arm, Patient Position: Sitting, Cuff Size: large)   Pulse 87   Temp 97.3 °F (36.3 °C) (Temporal)   Resp 18   Wt 226 lb (102.5 kg)   SpO2 98%   BMI 27.51 kg/m²  Body mass index is 27.51 kg/m².      GENERAL: well developed, well nourished,in no apparent distress  SKIN: no rashes,no suspicious lesions  HEENT: atraumatic, normocephalic,   NECK: supple,no adenopathy   LUNGS: clear to auscultation  CARDIO: RRR without murmur  GI: good BS's,no masses, HSM or tenderness  EXTREMITIES: no cyanosis, clubbing or edema, orthotics in place.     ASSESSMENT AND PLAN:     Encounter Diagnoses   Name Primary?    Healthy adult on routine physical examination Yes    Screening for malignant neoplasm of prostate     Chronic foot pain, unspecified laterality     Left foot pain        Diagnoses and all orders for this visit:    Healthy adult on routine physical examination  -     CBC With Differential With Platelet; Future  -     Comp Metabolic Panel (14); Future  -     Lipid Panel; Future  -     PSA Total, Screen; Future  -     VENIPUNCTURE  - check labs as ordered.   - up to date with other preventative health care.     Screening for malignant neoplasm of prostate  -     PSA Total, Screen; Future    Chronic foot pain, unspecified laterality    Left foot pain    Get orthotics, follow up with podiatry. Will send him back to work after orthotics are in. Can go back without restrictions.     Orders  Placed This Encounter   Procedures    CBC With Differential With Platelet     Standing Status:   Future     Number of Occurrences:   1     Standing Expiration Date:   6/21/2025    Comp Metabolic Panel (14)     Standing Status:   Future     Number of Occurrences:   1     Standing Expiration Date:   6/21/2025     Order Specific Question:   [facility]: Has the patient fasted?     Answer:   Yes    Lipid Panel     Standing Status:   Future     Number of Occurrences:   1     Standing Expiration Date:   6/20/2025     Order Specific Question:   [facility]: Has the patient fasted?     Answer:   Yes    PSA Total, Screen     Standing Status:   Future     Number of Occurrences:   1     Standing Expiration Date:   6/20/2025    VENIPUNCTURE     Order Specific Question:   Release to patient     Answer:   Immediate               Meds & Refills for this Visit:  Requested Prescriptions      No prescriptions requested or ordered in this encounter             The patient indicates understanding of these issues and agrees to the plan.

## 2024-06-24 ENCOUNTER — TELEPHONE (OUTPATIENT)
Dept: FAMILY MEDICINE CLINIC | Facility: CLINIC | Age: 64
End: 2024-06-24

## 2024-06-24 NOTE — TELEPHONE ENCOUNTER
Spoke with patient and advised Dr Marroquin faxed forms 6/20/24. Is there something else needed?    Patient will reach out to [facility] and let office know

## 2024-07-05 DIAGNOSIS — F41.9 ANXIETY: ICD-10-CM

## 2024-07-05 RX ORDER — ALPRAZOLAM 0.5 MG/1
0.5 TABLET ORAL 3 TIMES DAILY PRN
Qty: 90 TABLET | Refills: 0 | Status: SHIPPED | OUTPATIENT
Start: 2024-07-05

## 2024-07-18 DIAGNOSIS — B35.6 TINEA CRURIS: ICD-10-CM

## 2024-07-18 RX ORDER — CLOTRIMAZOLE AND BETAMETHASONE DIPROPIONATE 10; .64 MG/G; MG/G
1 CREAM TOPICAL 2 TIMES DAILY
Qty: 45 G | Refills: 0 | Status: SHIPPED | OUTPATIENT
Start: 2024-07-18 | End: 2024-08-01

## 2024-07-18 NOTE — TELEPHONE ENCOUNTER
Patient has appt for tomorrow for other issue    Requests refill    clotrimazole-betamethasone 1-0.05 % External Cream   Used for jock itch    [facility]

## 2024-07-19 ENCOUNTER — OFFICE VISIT (OUTPATIENT)
Dept: FAMILY MEDICINE CLINIC | Facility: CLINIC | Age: 64
End: 2024-07-19
Payer: COMMERCIAL

## 2024-07-19 VITALS
DIASTOLIC BLOOD PRESSURE: 82 MMHG | WEIGHT: 228 LBS | BODY MASS INDEX: 28 KG/M2 | SYSTOLIC BLOOD PRESSURE: 120 MMHG | TEMPERATURE: 98 F | RESPIRATION RATE: 18 BRPM | OXYGEN SATURATION: 96 % | HEART RATE: 57 BPM

## 2024-07-19 DIAGNOSIS — M79.605 PAIN OF LEFT ANTERIOR LOWER EXTREMITY: Primary | ICD-10-CM

## 2024-07-19 DIAGNOSIS — M25.669 STIFFNESS OF JOINT OF LOWER LEG: ICD-10-CM

## 2024-07-19 DIAGNOSIS — I83.813 VARICOSE VEINS OF BOTH LOWER EXTREMITIES WITH PAIN: ICD-10-CM

## 2024-07-19 PROCEDURE — 3079F DIAST BP 80-89 MM HG: CPT | Performed by: FAMILY MEDICINE

## 2024-07-19 PROCEDURE — 99214 OFFICE O/P EST MOD 30 MIN: CPT | Performed by: FAMILY MEDICINE

## 2024-07-19 PROCEDURE — 3074F SYST BP LT 130 MM HG: CPT | Performed by: FAMILY MEDICINE
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
## 2024-08-02 DIAGNOSIS — R03.0 ELEVATED BLOOD PRESSURE READING: ICD-10-CM

## 2024-08-02 DIAGNOSIS — F41.9 ANXIETY: ICD-10-CM

## 2024-08-02 DIAGNOSIS — I77.810 DILATION OF THORACIC AORTA (HCC): ICD-10-CM

## 2024-08-02 RX ORDER — ALPRAZOLAM 0.5 MG/1
0.5 TABLET ORAL 3 TIMES DAILY PRN
Qty: 90 TABLET | Refills: 0 | Status: SHIPPED | OUTPATIENT
Start: 2024-08-02

## 2024-08-02 NOTE — TELEPHONE ENCOUNTER
Last OV 7/19/24  Last refilled 7/5/24 alprazolam  #90  0 refills    Should have refill at pharmacy of rosuvastatin and lisinopril

## 2024-08-02 NOTE — TELEPHONE ENCOUNTER
[facility]/pharmacy #[id number] - [place], [place] - [address] [phone], [phone]   [address] [place] [address]   Phone: [phone] Fax: [phone]   Hours: Not open 24 hours       PATIENT REQUESTING REFILL FOR     ALPRAZolam 0.5 MG Oral Tab 90 tablet 0 7/5/2024 --   Sig:   Take 1 tablet (0.5 mg total) by mouth 3 (three) times daily as needed for Anxiety.     Route:   Oral             PATIENT SAYS HE WILL BE GETTING MEDICARE NEXT MONTH AND ASKING IF HE CAN GET A REFILL ON THESE.  NOT SURE HOW EARLY THESE ARE      rosuvastatin 10 MG Oral Tab 90 tablet 3 5/13/2024 --   Sig:   Take 1 tablet (10 mg total) by mouth nightly.       LISINOPRIL 10 MG Oral Tab 90 tablet 3 2/24/2024 --   Sig:   TAKE 1 TABLET BY MOUTH EVERY DAY

## 2024-08-31 ENCOUNTER — TELEPHONE (OUTPATIENT)
Dept: FAMILY MEDICINE CLINIC | Facility: CLINIC | Age: 64
End: 2024-08-31

## 2024-08-31 DIAGNOSIS — F41.9 ANXIETY: ICD-10-CM

## 2024-08-31 RX ORDER — ALPRAZOLAM 0.5 MG
0.5 TABLET ORAL 3 TIMES DAILY PRN
Qty: 90 TABLET | Refills: 0 | Status: SHIPPED | OUTPATIENT
Start: 2024-08-31

## 2024-08-31 NOTE — TELEPHONE ENCOUNTER
Pt needs the following refilled:    ALPRAZolam 0.5 MG Oral Tab [[id number]] (Order [id number]     Please send to:  [facility]/pharmacy #[id number] - [place], [place] - [address] [phone], [phone]   [address] [place] [address]   Phone: [phone] Fax: [phone]   Thank you!

## 2024-08-31 NOTE — TELEPHONE ENCOUNTER
Last OV: 08/02/2024  Last refill: 90 tabs, 0 refill     Medication pended, please sign if appropriate

## 2024-09-04 DIAGNOSIS — R03.0 ELEVATED BLOOD PRESSURE READING: ICD-10-CM

## 2024-09-04 DIAGNOSIS — I77.810 DILATION OF THORACIC AORTA (HCC): ICD-10-CM

## 2024-09-04 RX ORDER — ROSUVASTATIN CALCIUM 10 MG/1
10 TABLET, COATED ORAL NIGHTLY
Qty: 90 TABLET | Refills: 3 | Status: SHIPPED | OUTPATIENT
Start: 2024-09-04

## 2024-09-04 RX ORDER — LISINOPRIL 10 MG/1
10 TABLET ORAL DAILY
Qty: 90 TABLET | Refills: 3 | Status: SHIPPED | OUTPATIENT
Start: 2024-09-04

## 2024-09-04 NOTE — TELEPHONE ENCOUNTER
Hypertension Medications Protocol Cxbkiq1009/04/2024 09:09 AM   Protocol Details CMP or BMP in past 12 months    Last BP reading less than 140/90    In person appointment or virtual visit in the past 12 mos or appointment in next 3 mos    EGFRCR or GFRNAA > 50       Cholesterol Medication Protocol Bjpvur3409/04/2024 09:09 AM   Protocol Details ALT < 80    ALT resulted within past year    Lipid panel within past 12 months    In person appointment or virtual visit in the past 12 mos or appointment in next 3 mos          LOV 7/19/24    Last Refill   LISINOPRIL 10 MG Oral Tab 90 tablet 3 2/24/2024     rosuvastatin 10 MG Oral Tab 90 tablet 3 5/13/2024       Labs 6/20/24     No future appointments.

## 2024-09-06 DIAGNOSIS — F41.9 ANXIETY: ICD-10-CM

## 2024-09-06 RX ORDER — ALPRAZOLAM 0.5 MG
0.5 TABLET ORAL 3 TIMES DAILY PRN
Qty: 90 TABLET | Refills: 0 | OUTPATIENT
Start: 2024-09-06

## 2024-09-06 NOTE — TELEPHONE ENCOUNTER
Controlled Substance Medication Pbbzpy1709/06/2024 08:16 AM    This medication is a controlled substance - forward to provider to refill       LOV 7/19/24     Last Refill   Medication Quantity Refills Start End   ALPRAZolam 0.5 MG Oral Tab 90 tablet 0 8/31/2024        No future appointments.

## 2024-09-10 ENCOUNTER — TELEPHONE (OUTPATIENT)
Dept: FAMILY MEDICINE CLINIC | Facility: CLINIC | Age: 64
End: 2024-09-10

## 2024-09-10 DIAGNOSIS — F41.9 ANXIETY: ICD-10-CM

## 2024-09-10 DIAGNOSIS — R03.0 ELEVATED BLOOD PRESSURE READING: ICD-10-CM

## 2024-09-10 DIAGNOSIS — I77.810 DILATION OF THORACIC AORTA (HCC): ICD-10-CM

## 2024-09-10 RX ORDER — ROSUVASTATIN CALCIUM 10 MG/1
10 TABLET, COATED ORAL NIGHTLY
Qty: 90 TABLET | Refills: 3 | Status: SHIPPED | OUTPATIENT
Start: 2024-09-10

## 2024-09-10 RX ORDER — LISINOPRIL 10 MG/1
10 TABLET ORAL DAILY
Qty: 90 TABLET | Refills: 3 | Status: SHIPPED | OUTPATIENT
Start: 2024-09-10

## 2024-09-10 RX ORDER — ALPRAZOLAM 0.5 MG
0.5 TABLET ORAL 3 TIMES DAILY PRN
Qty: 90 TABLET | Refills: 0 | Status: SHIPPED | OUTPATIENT
Start: 2024-09-10

## 2024-09-10 NOTE — TELEPHONE ENCOUNTER
Last OV 7/19/24  Last lab 6/20/24 cmp, lipid  Last refilled to [facility]. Patient requesting resent to [facility] Mail order  Called and spoke with pharmacy staff who cancelled lisinopril and rosuvastatin    States alprazolam was already sold.     Routed to Dr Marroquin to advise    
Scripts sent.   
See previous encounter    Rx should go to [facility]    lisinopril 10 MG Oral Tab     rosuvastatin 10 MG Oral Tab     ALPRAZolam 0.5 MG Oral Tab (not sent?)    Please adv  Thank you  
Ex (09068)  resistance Sets/time Reps Notes/Cues/Progressions   UBE   4' 2 each direction      10    Supine hammock stretch  30\" 3 12/27   Mid rows  High rows  LPD 4 plates  3 plates  3 plates 3  2  2 10  10  10 12/4   Pulley   X 15 flexion  X 15  Abduction    IR stretch R 3 x 30\"    Wall slides:   SB roll up wall  Scaption (V) facing wall        X 10   X 10       RTC strengthening:  Shoulder flexion, supine  Shoulder Abd, sidelying  Shoulder IR  Shoulder ER   lime  2#  DBL lime  3#  Blue   Lime   2    2    3  3     10  10  10  10  10  10           12/4   S/L ER  2# 2 10    Pec stretch in doorway   2 x 30\"    Horiz Abduction   Lime 2 10           Sleeper stretch   2 x 30\"                  Manual Intervention (42299)  TIME     PROM R shoulder with stretch at end range, caudal and posterior glides, gentle distraction with oscillations, DTM as needed to decrease muscle tightness.    X 5 min     STM bicep/pec tendon/ lat/teres tendons  X 6 mins                          NMR re-education (90865) resistance Sets/time Reps CUES NEEDED   Serratus punches 3# 2 X 10     Cw/ccw 3# 2 X 10 each                         Therapeutic Activity (29327)  Sets/time     12/4: pt education regarding importance of scapular mobility and posture on shoulder mobility/function. Educated pt on use of ice for pain and inflammation.                                     Modalities:    No modalities applied this session; pt declined stating he would ice at home.      Education/Home Exercise Program: Patient HEP program created electronically.  Refer to [employer] access code:    Access Code: [email or web address]  URL: [email or web address]  Date: 11/30/2023  Prepared by: Sherry Neal    Exercises  - Seated Scapular Retraction  - 1-2 x daily - 7 x weekly - 1-2 sets - 10 reps  - Standing shoulder flexion wall slides  - 1-2 x daily - 7 x weekly - 1-2 sets - 10 reps  - Standing Shoulder Row with Anchored Resistance  - 1-2 x daily - 7 x weekly - 1-2 sets - 10

## 2024-09-19 ENCOUNTER — OFFICE VISIT (OUTPATIENT)
Dept: FAMILY MEDICINE CLINIC | Facility: CLINIC | Age: 64
End: 2024-09-19
Payer: MEDICARE

## 2024-09-19 VITALS
DIASTOLIC BLOOD PRESSURE: 70 MMHG | SYSTOLIC BLOOD PRESSURE: 126 MMHG | BODY MASS INDEX: 27 KG/M2 | WEIGHT: 223.63 LBS | RESPIRATION RATE: 20 BRPM | OXYGEN SATURATION: 99 % | HEART RATE: 86 BPM | TEMPERATURE: 97 F

## 2024-09-19 DIAGNOSIS — L98.9 BENIGN SKIN LESION: ICD-10-CM

## 2024-09-19 DIAGNOSIS — D18.01 CHERRY ANGIOMA: Primary | ICD-10-CM

## 2024-09-19 PROCEDURE — 3078F DIAST BP <80 MM HG: CPT | Performed by: FAMILY MEDICINE

## 2024-09-19 PROCEDURE — 3074F SYST BP LT 130 MM HG: CPT | Performed by: FAMILY MEDICINE

## 2024-09-19 PROCEDURE — 99213 OFFICE O/P EST LOW 20 MIN: CPT | Performed by: FAMILY MEDICINE

## 2024-09-19 NOTE — PROGRESS NOTES
Sylvester Altamirano is a 63 year old male.  Chief Complaint   Patient presents with    Spot     On chest       HPI:   Noticing more moles on chest. Noticing more and more. Not sure how long they've been there.   They do not bother him.     He retired from [employer]. Got bored, now working full time for a farmer, driving a truck. Tired again. Starting harvest next week.     ALLERGIES:  No Known Allergies      Current Outpatient Medications   Medication Sig Dispense Refill    lisinopril 10 MG Oral Tab Take 1 tablet (10 mg total) by mouth daily. 90 tablet 3    rosuvastatin 10 MG Oral Tab Take 1 tablet (10 mg total) by mouth nightly. 90 tablet 3    ALPRAZolam 0.5 MG Oral Tab Take 1 tablet (0.5 mg total) by mouth 3 (three) times daily as needed for Anxiety. 90 tablet 0      Past Medical History:    Abdominal hernia    Anxiety state, unspecified    Contact dermatitis and other eczema, due to unspecified cause    Hearing loss    Hemorrhoids    Wears glasses      Social History:  Social History     Socioeconomic History    Marital status:    Tobacco Use    Smoking status: Never    Smokeless tobacco: Never   Vaping Use    Vaping status: Never Used   Substance and Sexual Activity    Alcohol use: No    Drug use: No   Other Topics Concern    Caffeine Concern No     Social Determinants of Health      Received from [facility]    Housing Stability        BP Readings from Last 6 Encounters:   09/19/24 126/70   07/19/24 120/82   06/20/24 124/72   05/13/24 128/82   05/03/24 130/80   04/19/24 126/80       Wt Readings from Last 6 Encounters:   09/19/24 223 lb 9.6 oz (101.4 kg)   07/19/24 228 lb (103.4 kg)   06/20/24 226 lb (102.5 kg)   05/13/24 225 lb (102.1 kg)   05/03/24 222 lb (100.7 kg)   04/19/24 223 lb 9.6 oz (101.4 kg)       REVIEW OF SYSTEMS:   GENERAL HEALTH: feels well no complaints  SKIN: denies any unusual skin lesions or rashes  RESPIRATORY: denies shortness of breath    CARDIOVASCULAR: denies chest pain on  exertion  GI: denies abdominal pain and denies heartburn  NEURO: denies headaches    EXAM:   /70 (BP Location: Left arm, Patient Position: Sitting, Cuff Size: large)   Pulse 86   Temp 97.2 °F (36.2 °C) (Temporal)   Resp 20   Wt 223 lb 9.6 oz (101.4 kg)   SpO2 99%   BMI 27.22 kg/m²  Body mass index is 27.22 kg/m².    GENERAL: well developed, well nourished,in no apparent distress  SKIN: no rashes,no suspicious lesions, + many cherry angiomas on chest and abd.   HEENT: atraumatic, normocephalic,   NECK: supple,no adenopathy,   LUNGS: no distress   CARDIO: RRR without murmur  GI: good BS's,no masses, HSM or tenderness  EXTREMITIES: no cyanosis, clubbing or edema  Psych; normal affect     ASSESSMENT AND PLAN:     Encounter Diagnoses   Name Primary?    Cherry angioma Yes    Benign skin lesion        Diagnoses and all orders for this visit:    Cherry angioma    Benign skin lesion    Monitor skin lesions, they appear benign.     No orders of the defined types were placed in this encounter.              Meds & Refills for this Visit:  Requested Prescriptions      No prescriptions requested or ordered in this encounter             The patient indicates understanding of these issues and agrees to the plan.

## 2024-09-28 ENCOUNTER — TELEPHONE (OUTPATIENT)
Dept: FAMILY MEDICINE CLINIC | Facility: CLINIC | Age: 64
End: 2024-09-28

## 2024-09-28 DIAGNOSIS — G47.00 INSOMNIA, UNSPECIFIED TYPE: Primary | ICD-10-CM

## 2024-09-28 NOTE — TELEPHONE ENCOUNTER
Patient states he takes alprazolam 0.5 mg at night and he is waking up 2-3 times.  Took 2 tabs and he slept better.    Asking if he can increase. Patient advised Dr Marroquin out of office until Monday and we will call back with her recommendation at that time

## 2024-09-28 NOTE — TELEPHONE ENCOUNTER
Pt calling- would like to up dose on Alprazolam- another half mg to aide with sleep. Pt concerned about insurance coverage as he is on Medicare.     Pt has [employer]age.

## 2024-09-30 NOTE — TELEPHONE ENCOUNTER
People tend to build up a tolerance to this medicine, kind of like one would with alcohol use.   So, we need to be careful with it.   I really do not want to increase it. Then you'd get used to a higher dose and then it gets dangerous to withdraw from it if you run out or stop.     So, lets leave it the same. But, we can try a different medicine for sleep. There are other safer alternatives. I forget if he's tried things in the past.   Like trazodone? Amitriptyline?

## 2024-09-30 NOTE — TELEPHONE ENCOUNTER
Attempted to contact patient at number in message, but after 12 rings there was no answer or voicemail.  Attempted to reach patient on cell number on verbal release but after 12 rings no answer or voicemail    Mychart sent.

## 2024-10-01 RX ORDER — TRAZODONE HYDROCHLORIDE 50 MG/1
50 TABLET, FILM COATED ORAL NIGHTLY PRN
Qty: 30 TABLET | Refills: 0 | Status: SHIPPED | OUTPATIENT
Start: 2024-10-01

## 2024-10-01 NOTE — TELEPHONE ENCOUNTER
Per Dr Marroquin, take trazodone, then 30 min later take 1/2 tab alprazolam as both meds cause drowsiness. May not need full dose alprazolam     Patient notified and verbalized understanding.

## 2024-10-01 NOTE — TELEPHONE ENCOUNTER
Script sent for trazodone to pharmacy.   Try 1 tab nightly. Do not take at the same time as alprazolam. Take at least 30 min apart.

## 2024-10-01 NOTE — TELEPHONE ENCOUNTER
Patient notified and verbalized understanding.     Has not tried amitriptyline or trazodone  Requests script sent to [facility]

## 2024-11-19 DIAGNOSIS — F41.9 ANXIETY: ICD-10-CM

## 2024-11-19 RX ORDER — ALPRAZOLAM 0.25 MG/1
0.25 TABLET ORAL EVERY 6 HOURS PRN
Qty: 120 TABLET | Refills: 0 | Status: SHIPPED | OUTPATIENT
Start: 2024-11-19

## 2024-11-19 NOTE — TELEPHONE ENCOUNTER
Controlled Substance Medication [id number]/19/2024 08:50 AM    This medication is a controlled substance - forward to provider to refill       Last refill:   Medication Quantity Refills Start End   ALPRAZolam 0.25 MG Oral Tab 120 tablet 0 10/21/2024      Last Visit: 9/19/24   Next Visit: No future appointments.      Forward to Dr. Marroquin   please advise on refills. Thanks.

## 2024-12-02 DIAGNOSIS — F41.9 ANXIETY: ICD-10-CM

## 2024-12-02 NOTE — TELEPHONE ENCOUNTER
I just filled this 11/19, but looks like this is the mail order, so they can keep it on file.     But, can you call him? Is he still weaning down his dose? How much is he taking?

## 2024-12-02 NOTE — TELEPHONE ENCOUNTER
Controlled Substance Medication [id number]/02/2024 12:03 PM    This medication is a controlled substance - forward to provider to refill          Last refill:   Medication Quantity Refills Start End   ALPRAZOLAM 0.25 MG Oral Tab 120 tablet 0 11/19/2024      Last Visit: 9/19/24    Next Visit: No future appointments.      Forward to Dr. Marroquin please advise on refills. Thanks.

## 2024-12-03 RX ORDER — ALPRAZOLAM 0.25 MG/1
0.25 TABLET ORAL EVERY 6 HOURS PRN
Qty: 120 TABLET | Refills: 0 | OUTPATIENT
Start: 2024-12-03

## 2024-12-04 NOTE — TELEPHONE ENCOUNTER
I called and spoke to the patient and he stated that it is fine to keep that one on file and that they will refill it again once he is due, and he said he has been taking 1MG a day so he did wean off of it a little bit and he said he wants to stay at the 1MG because that is the dose where he is the most comfortable at.

## 2024-12-16 DIAGNOSIS — F41.9 ANXIETY: ICD-10-CM

## 2024-12-16 RX ORDER — ALPRAZOLAM 0.25 MG/1
0.25 TABLET ORAL EVERY 6 HOURS PRN
Qty: 120 TABLET | Refills: 0 | Status: SHIPPED | OUTPATIENT
Start: 2024-12-16

## 2024-12-16 NOTE — TELEPHONE ENCOUNTER
Patient states he is needing refill of alprazolam 0.25 mg every 6 hours as needed sent to mail order.   He called and spoke with them and they have no scripts on file. Will need new one sent.  Patient asking if he should request refill from mail order or our office every month. States he has trouble with the mail order.    Advised patient ok to call our office when refill is needed.      Last OV 9/19/24  Last refilled 11/19/24  #120 0 refill

## 2025-01-15 DIAGNOSIS — F41.9 ANXIETY: ICD-10-CM

## 2025-01-15 NOTE — TELEPHONE ENCOUNTER
Last OV:09/19/2024 lesion, 06/20/2024 physical     Last refill:12/16/2024 120 tabs, 0 refill     Medication pended, please sign if appropriate

## 2025-01-16 RX ORDER — ALPRAZOLAM 0.25 MG/1
0.25 TABLET ORAL EVERY 6 HOURS PRN
Qty: 120 TABLET | Refills: 0 | Status: SHIPPED | OUTPATIENT
Start: 2025-01-16

## 2025-01-22 ENCOUNTER — TELEPHONE (OUTPATIENT)
Dept: FAMILY MEDICINE CLINIC | Facility: CLINIC | Age: 65
End: 2025-01-22

## 2025-01-22 NOTE — TELEPHONE ENCOUNTER
Pt has a question about alprazolam - 30 day supply.  Pt states that [facility] is charging more than they did last year.  [facility] told pt that he should be able to get a 90 day supply and then there would be no charge.  Pt would like to know if this is possible.  He doesn't need a refill now - for future info only.   Please advise.  Thank you!

## 2025-01-24 NOTE — TELEPHONE ENCOUNTER
Is he still trying to wean down?     360 tabs is a lot of alprazolam to send in one script. I think it's dangerous to keep that much around at once, so I do not prescribe that much.

## 2025-01-24 NOTE — TELEPHONE ENCOUNTER
Patient notified and verbalized understanding.     States he only asked because [employer] advised him it would be no cost.  Patient understands Dr Marroquin position and will continue with monthly prescriptions    States he is still working on weaning down. Is currently has 1 mg total per day from 1.5 mg. Will continue current does at this time but plans to wean down further in the future.

## 2025-02-17 DIAGNOSIS — G47.00 INSOMNIA, UNSPECIFIED TYPE: ICD-10-CM

## 2025-02-17 RX ORDER — TRAZODONE HYDROCHLORIDE 50 MG/1
50 TABLET ORAL NIGHTLY PRN
Qty: 30 TABLET | Refills: 11 | Status: SHIPPED | OUTPATIENT
Start: 2025-02-17

## 2025-02-20 ENCOUNTER — PATIENT OUTREACH (OUTPATIENT)
Dept: FAMILY MEDICINE CLINIC | Facility: CLINIC | Age: 65
End: 2025-02-20

## 2025-03-14 DIAGNOSIS — F41.9 ANXIETY: ICD-10-CM

## 2025-03-14 RX ORDER — ALPRAZOLAM 0.5 MG
0.5 TABLET ORAL 3 TIMES DAILY PRN
Qty: 90 TABLET | Refills: 0 | Status: SHIPPED | OUTPATIENT
Start: 2025-03-14

## 2025-03-14 NOTE — TELEPHONE ENCOUNTER
Last OV:02/13/2025  Last refill:02/13/2025 90 tabs 0 refill     Medication pended, please sign if appropriate

## 2025-03-14 NOTE — TELEPHONE ENCOUNTER
[facility] - [place], [place] - [address] [phone], [phone]   [address] [address]   Phone: [phone] Fax: [phone]   Hours: Not open 24 hours       PATIENT REQUESTING REFILL              ALPRAZolam 0.5 MG Oral Tab 90 tablet 0 2/13/2025 --   Sig:   Take 1 tablet (0.5 mg total) by mouth 3 (three) times daily as needed for Anxiety.     Route:   Oral

## 2025-04-14 DIAGNOSIS — F41.9 ANXIETY: ICD-10-CM

## 2025-04-14 RX ORDER — ALPRAZOLAM 0.5 MG
0.5 TABLET ORAL 3 TIMES DAILY PRN
Qty: 90 TABLET | Refills: 0 | Status: SHIPPED | OUTPATIENT
Start: 2025-04-14

## 2025-04-14 NOTE — TELEPHONE ENCOUNTER
Alprazolam 0.5 mg  Last time medication was refilled 3/14/25  Quantity and number of refills 90 w/ 0  Last OV 2/13/25  Next OV none scheduled

## 2025-05-10 ENCOUNTER — TELEPHONE (OUTPATIENT)
Dept: FAMILY MEDICINE CLINIC | Facility: CLINIC | Age: 65
End: 2025-05-10

## 2025-05-10 NOTE — TELEPHONE ENCOUNTER
Pt is coming for Supervisit and would like to get shingles shot also.      Future Appointments   Date Time Provider Department Center   5/22/2025  8:15 AM Lynn Marroquin DO [facility] [place]

## 2025-05-13 DIAGNOSIS — F41.9 ANXIETY: ICD-10-CM

## 2025-05-13 RX ORDER — ALPRAZOLAM 0.5 MG
0.5 TABLET ORAL 3 TIMES DAILY PRN
Qty: 90 TABLET | Refills: 0 | Status: SHIPPED | OUTPATIENT
Start: 2025-05-13

## 2025-05-13 NOTE — TELEPHONE ENCOUNTER
Patient calling to request refill of ALPRAZolam 0.5 MG Oral Tab   Pharmacy [facility] - [place], [place] - [address] -[phone], [phone] [1980]

## 2025-05-22 ENCOUNTER — OFFICE VISIT (OUTPATIENT)
Dept: FAMILY MEDICINE CLINIC | Facility: CLINIC | Age: 65
End: 2025-05-22
Payer: MEDICARE

## 2025-05-22 VITALS
HEART RATE: 57 BPM | SYSTOLIC BLOOD PRESSURE: 120 MMHG | WEIGHT: 220.19 LBS | RESPIRATION RATE: 18 BRPM | OXYGEN SATURATION: 98 % | BODY MASS INDEX: 27 KG/M2 | TEMPERATURE: 97 F | DIASTOLIC BLOOD PRESSURE: 70 MMHG

## 2025-05-22 DIAGNOSIS — R03.0 ELEVATED BLOOD PRESSURE READING: ICD-10-CM

## 2025-05-22 DIAGNOSIS — K43.9 HERNIA OF ABDOMINAL WALL: ICD-10-CM

## 2025-05-22 DIAGNOSIS — G89.29 CHRONIC PAIN IN LEFT FOOT: ICD-10-CM

## 2025-05-22 DIAGNOSIS — Z98.890 S/P LEFT INGUINAL HERNIA REPAIR: ICD-10-CM

## 2025-05-22 DIAGNOSIS — R00.1 BRADYCARDIA: ICD-10-CM

## 2025-05-22 DIAGNOSIS — I77.810 DILATION OF THORACIC AORTA: ICD-10-CM

## 2025-05-22 DIAGNOSIS — Z00.00 ENCOUNTER FOR ANNUAL HEALTH EXAMINATION: Primary | ICD-10-CM

## 2025-05-22 DIAGNOSIS — F41.9 ANXIETY: ICD-10-CM

## 2025-05-22 DIAGNOSIS — Z12.5 SCREENING FOR MALIGNANT NEOPLASM OF PROSTATE: ICD-10-CM

## 2025-05-22 DIAGNOSIS — R35.0 FREQUENT URINATION: ICD-10-CM

## 2025-05-22 DIAGNOSIS — G47.00 INSOMNIA, UNSPECIFIED TYPE: ICD-10-CM

## 2025-05-22 DIAGNOSIS — M79.672 CHRONIC PAIN IN LEFT FOOT: ICD-10-CM

## 2025-05-22 DIAGNOSIS — I71.21 ANEURYSM OF ASCENDING AORTA WITHOUT RUPTURE: ICD-10-CM

## 2025-05-22 DIAGNOSIS — Z87.19 S/P LEFT INGUINAL HERNIA REPAIR: ICD-10-CM

## 2025-05-22 DIAGNOSIS — Z13.6 SCREENING FOR CARDIOVASCULAR CONDITION: ICD-10-CM

## 2025-05-22 NOTE — PROGRESS NOTES
Sylvester Altamirano is a 64 year old male.  Chief Complaint   Patient presents with    Physical     MAW        HPI:   Foot pain: the same. Has seen Dr. Wiley. Has orthotics. Help some, but comes and goes. He is dealing with it.     Currently not working, applying for part-time jobs.     He has been off work completely due to the foot pain.     He is fasting for blood work today.     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       ALLERGIES:  No Known Allergies      Current Outpatient Medications   Medication Sig Dispense Refill    ALPRAZolam 0.5 MG Oral Tab Take 1 tablet (0.5 mg total) by mouth 3 (three) times daily as needed for Anxiety. 90 tablet 0    TRAZODONE 50 MG Oral Tab TAKE 1 TABLET EVERY NIGHT AS NEEDED FOR SLEEP 30 tablet 11    ALPRAZolam 0.25 MG Oral Tab Take 1 tablet (0.25 mg total) by mouth every 6 (six) hours as needed. 120 tablet 0    lisinopril 10 MG Oral Tab Take 1 tablet (10 mg total) by mouth daily. 90 tablet 3    rosuvastatin 10 MG Oral Tab Take 1 tablet (10 mg total) by mouth nightly. 90 tablet 3      Past Medical History:    Abdominal hernia    Anxiety state, unspecified    Contact dermatitis and other eczema, due to unspecified cause    Hearing loss    Hemorrhoids    Wears glasses      Social History:  Social History     Socioeconomic History    Marital status:    Tobacco Use    Smoking status: Never    Smokeless tobacco: Never   Vaping Use    Vaping status: Never Used   Substance and Sexual Activity    Alcohol use: No    Drug use: No   Other Topics Concern    Caffeine Concern No     Social Drivers of Health     Food Insecurity: No Food Insecurity (2/13/2025)    NCSS - Food Insecurity     Worried About Running Out of Food in the Last Year: No     Ran Out of Food in the Last Year: No   Transportation Needs: No Transportation Needs (2/13/2025)    NCSS - Transportation     Lack of Transportation: No   Housing Stability: Not At Risk (2/13/2025)    NCSS - Housing/Utilities     Has  Housing: Yes     Worried About Losing Housing: No     Unable to Get Utilities: No        BP Readings from Last 6 Encounters:   05/22/25 120/70   02/13/25 126/72   09/19/24 126/70   07/19/24 120/82   06/20/24 124/72   05/13/24 128/82       Wt Readings from Last 6 Encounters:   05/22/25 220 lb 3.2 oz (99.9 kg)   02/13/25 224 lb 6.4 oz (101.8 kg)   09/19/24 223 lb 9.6 oz (101.4 kg)   07/19/24 228 lb (103.4 kg)   06/20/24 226 lb (102.5 kg)   05/13/24 225 lb (102.1 kg)       REVIEW OF SYSTEMS:   GENERAL HEALTH: feels well no complaints  SKIN: denies any unusual skin lesions or rashes  RESPIRATORY: denies shortness of breath    CARDIOVASCULAR: denies chest pain    GI: denies abdominal pain and denies heartburn  NEURO: denies headaches    EXAM:   /70   Pulse 57   Temp 97.2 °F (36.2 °C) (Temporal)   Resp 18   Wt 220 lb 3.2 oz (99.9 kg)   SpO2 98%   BMI 26.80 kg/m²  Body mass index is 26.8 kg/m².      GENERAL: well developed, well nourished,in no apparent distress  SKIN: no rashes,no suspicious lesions  HEENT: atraumatic, normocephalic,   NECK: supple,no adenopathy   LUNGS: clear to auscultation  CARDIO: RRR without murmur  GI: good BS's,no masses, HSM or tenderness  EXTREMITIES: no cyanosis, clubbing or edema, orthotics in place.     ASSESSMENT AND PLAN:     No diagnosis found.      Diagnoses and all orders for this visit:    Healthy adult on routine physical examination  -     CBC With Differential With Platelet; Future  -     Comp Metabolic Panel (14); Future  -     Lipid Panel; Future  -     PSA Total, Screen; Future  -     VENIPUNCTURE  - check labs as ordered.   - up to date with other preventative health care.     Screening for malignant neoplasm of prostate  -     PSA Total, Screen; Future    Chronic foot pain, unspecified laterality    Left foot pain    Get orthotics, follow up with podiatry. Will send him back to work after orthotics are in. Can go back without restrictions.     No orders of the defined  types were placed in this encounter.              Meds & Refills for this Visit:  Requested Prescriptions      No prescriptions requested or ordered in this encounter             The patient indicates understanding of these issues and agrees to the plan.

## 2025-05-23 NOTE — PROGRESS NOTES
Subjective:   Sylvester Altamirano is a 64 year old male who presents for a MA AHA (Medicare Advantage Annual Health Assessment) and Medicare Initial Preventative Physical Exam (Welcome to Medicare- < 12 months on Medicare) and scheduled follow up of multiple significant but stable problems.             Foot pain: the same. Has seen Dr. Wiley. Has orthotics. Help some, but comes and goes. He is dealing with it.     He has been off work completely due to the foot pain. Currently not working, applying for part-time jobs. [employer] job was too hard on his body. Feeling down about being home and not working. He watches his grandson one day a week, which he loves.     Not due for PSA until next month.     Feeling well overall.     Still taking alprazolam for anxiety, 1 during the day and 2 for sleep at night most nights. Been taking that for years and it helps. He tried to wean down last year and didn't do as well.     Just had 2nd grandchild, a grand-daughter.     History/Other:   Fall Risk Assessment:   He has been screened for Falls and is low risk.      Cognitive Assessment:   He had a completely normal cognitive assessment - see flowsheet entries     Functional Ability/Status:   Sylvester Altamirano has some abnormal functions as listed below:  He has difficulties Affording Meds based on screening of functional status. He has Vision problems based on screening of functional status. He has problems with Memory based on screening of functional status.       Depression Screening (PHQ):  PHQ-9 TOTAL SCORE: 3  , done 5/22/2025   Little interest or pleasure in doing things: 1    Feeling down, depressed, or hopeless: 1    Trouble falling or staying asleep, or sleeping too much: 1     If you checked off any problems, how difficult have these problems made it for you to do your work, take care of things at home, or get along with other people?: Not difficult at all    Last [place] Suicide Screening on 5/22/2025 was No Risk.          Advanced  Directives:   He does NOT have a Living Will. [Do you have a living will?: No]  He does NOT have a Power of  for Health Care. [Do you have a healthcare power of ?: No]  Discussed Advance Care Planning with patient (and family/surrogate if present). Standard forms made available to patient in After Visit Summary.      Patient Active Problem List   Diagnosis    Hernia of abdominal wall    S/P left inguinal hernia repair    Anxiety    Frequent urination    Bradycardia    Chronic foot pain     Allergies:  He has no known allergies.    Current Medications:  Outpatient Medications Marked as Taking for the 5/22/25 encounter (Office Visit) with Lynn Marroquin, DO   Medication Sig    ALPRAZolam 0.5 MG Oral Tab Take 1 tablet (0.5 mg total) by mouth 3 (three) times daily as needed for Anxiety.    TRAZODONE 50 MG Oral Tab TAKE 1 TABLET EVERY NIGHT AS NEEDED FOR SLEEP    ALPRAZolam 0.25 MG Oral Tab Take 1 tablet (0.25 mg total) by mouth every 6 (six) hours as needed.    lisinopril 10 MG Oral Tab Take 1 tablet (10 mg total) by mouth daily.    rosuvastatin 10 MG Oral Tab Take 1 tablet (10 mg total) by mouth nightly.       Medical History:  He  has a past medical history of Abdominal hernia, Anxiety state, unspecified, Contact dermatitis and other eczema, due to unspecified cause, Hearing loss, Hemorrhoids, and Wears glasses.  Surgical History:  He  has a past surgical history that includes hernia surgery (Left, 07/18/16); hernia surgery (Right, 10/20/2009); colonoscopy,diagnostic (2/15/17); and colonoscopy (N/A, 2/15/2017).   Family History:  His family history includes Cancer in his mother.  Social History:  He  reports that he has never smoked. He has never used smokeless tobacco. He reports that he does not drink alcohol and does not use drugs.    Tobacco:  He has never smoked tobacco.    CAGE Alcohol Screen:   CAGE screening score of 0 on 5/22/2025, showing low risk of alcohol abuse.      Patient Care  Team:  Lynn Marroquin DO as PCP -  (Family Medicine)  Luis Urrutia (SURGERY, GENERAL)    Review of Systems  GENERAL: feels well otherwise  SKIN: denies any unusual skin lesions  EYES: denies blurred vision or double vision  HEENT: denies nasal congestion, sinus pain or ST  LUNGS: denies shortness of breath with exertion  CARDIOVASCULAR: denies chest pain on exertion  GI: denies abdominal pain, denies heartburn  : 0 per night nocturia, no complaint of urinary incontinence  MUSCULOSKELETAL: denies back pain, + foot and leg pain comes and goes.   NEURO: denies headaches  PSYCHE: denies depression or anxiety  HEMATOLOGIC: denies hx of anemia  ENDOCRINE: denies thyroid history  ALL/ASTHMA: denies hx of allergy or asthma    Objective:   Physical Exam  General Appearance:  Alert, cooperative, no distress, appears stated age   Head:  Normocephalic, without obvious abnormality, atraumatic   Eyes:  PERRL, conjunctiva/corneas clear, EOM's intact, both eyes   Ears:  Normal TM's and external ear canals, both ears   Nose: Nares normal, septum midline, mucosa normal, no drainage or sinus tenderness   Throat: Lips, mucosa, and tongue normal; teeth and gums normal   Neck: Supple, symmetrical, trachea midline, no adenopathy, thyroid: not enlarged, symmetric, no tenderness/mass/nodules,     Back:   Symmetric, no curvature, ROM normal, no CVA tenderness   Lungs:   Clear to auscultation bilaterally, respirations unlabored   Chest Wall:  No tenderness or deformity   Heart:  Regular rate and rhythm, S1, S2 normal, no murmur, rub or gallop   Abdomen:   Soft, non-tender, bowel sounds active all four quadrants,  no masses, no organomegaly   Extremities: Extremities normal, atraumatic, no cyanosis or edema   Pulses: 2+ and symmetric   Skin: Skin color, texture, turgor normal, no rashes or lesions   Lymph nodes: Cervical, supraclavicular nodes normal   Neurologic: Normal     /70   Pulse 57   Temp 97.2 °F (36.2 °C)  (Temporal)   Resp 18   Wt 220 lb 3.2 oz (99.9 kg)   SpO2 98%   BMI 26.80 kg/m²  Estimated body mass index is 26.8 kg/m² as calculated from the following:    Height as of 5/13/24: 6' 4\" (1.93 m).    Weight as of this encounter: 220 lb 3.2 oz (99.9 kg).    Medicare Hearing Assessment:   Hearing Screening    Time taken: 5/22/2025  8:16 AM  Screening Method: Finger Rub  Finger Rub Result: Pass         Visual Acuity:   Right Eye Visual Acuity: Corrected Right Eye Chart Acuity: 20/70   Left Eye Visual Acuity: Corrected Left Eye Chart Acuity: 20/70   Both Eyes Visual Acuity: Corrected Both Eyes Chart Acuity: 20/70            Assessment & Plan:   Sylvester Altamirano is a 64 year old male who presents for a Medicare Assessment.     1. Encounter for annual health examination (Primary)  -     Comp Metabolic Panel (14); Future; Expected date: 05/22/2025  -     CBC With Differential With Platelet; Future; Expected date: 05/22/2025  -     Lipid Panel; Future; Expected date: 05/22/2025  -     CBC With Differential With Platelet; Future; Expected date: 05/22/2025  - completed today. Labs to quest, to be done after June 20th.   2. Screening for malignant neoplasm of prostate  -     PSA Total, Screen; Future; Expected date: 05/22/2025  -     PSA Total, Diagnostic; Future; Expected date: 05/22/2025  -     PSA Total, Diagnostic  3. Screening for cardiovascular condition  -     Lipid Panel; Future; Expected date: 05/22/2025  4. Anxiety - doing well with current meds, alprazolam TID PRN   5. Insomnia, unspecified type- alprazolam nightly.   6. Elevated blood pressure reading- doing well with current meds.   7. Aneurysm of ascending aorta without rupture - recheck CTA thoracic aorta, last was checked in 2022.   8. Dilation of thoracic aorta  -     CTA GATED THORACIC AORTA (CPT=71275); Future; Expected date: 05/22/2025  9. Bradycardia- stable.   10. Chronic pain in left foot - stable, has seen podiatry, done PT, now dealing with current pain  level.   11. Frequent urination- stable, check PSA   12. Hernia of abdominal wall -  stable  13. S/P left inguinal hernia repair- very occasional pain at surgical site, tolerating it.             The patient indicates understanding of these issues and agrees to the plan.  Reinforced healthy diet, lifestyle, and exercise.      Return for Wellness Visit.     Lynn Marroquin DO, 5/22/2025     Supplementary Documentation:   General Health:  In the past six months, have you lost more than 10 pounds without trying?: 2 - No  Has your appetite been poor?: No  Type of Diet: Balanced  How does the patient maintain a good energy level?: Appropriate Exercise  How would you describe your daily physical activity?: Light  How would you describe your current health state?: Good  How do you maintain positive mental well-being?: Visiting Family  On a scale of 0 to 10, with 0 being no pain and 10 being severe pain, what is your pain level?: 1 - (Mild)  In the past six months, have you experienced urine leakage?: 0-No  At any time do you feel concerned for the safety/well-being of yourself and/or your children, in your home or elsewhere?: Yes  Have you had any immunizations at another office such as Influenza, Hepatitis B, Tetanus, or Pneumococcal?: No    Health Maintenance   Topic Date Due    Zoster Vaccines (1 of 2) Never done    COVID-19 Vaccine (3 - 2024-25 season) 09/01/2024    Annual Well Visit  Never done    Influenza Vaccine (Season Ended) 10/01/2025    PSA  06/20/2026    Colorectal Cancer Screening  02/15/2027    Annual Depression Screening  Completed    Fall Risk Screening (Annual)  Completed    Pneumococcal Vaccine: 50+ Years  Completed    Meningococcal B Vaccine  Aged Out

## 2025-05-28 NOTE — IMAGING NOTE
Pt called and instructed to arrive 15 minutes prior to gated study scheduled on 5/30/25 at 0845. Park in [facility], eat a light breakfast/lunch, hydrate, hold caffeine/decaff/chocolate x 12 hours prior, take all meds especially beta blockers prescribed. Pt encouraged to call with further questions.

## 2025-05-30 ENCOUNTER — HOSPITAL ENCOUNTER (OUTPATIENT)
Dept: CT IMAGING | Facility: HOSPITAL | Age: 65
Discharge: HOME | End: 2025-05-30
Attending: FAMILY MEDICINE
Payer: MEDICARE

## 2025-05-30 VITALS — SYSTOLIC BLOOD PRESSURE: 132 MMHG | HEART RATE: 70 BPM | DIASTOLIC BLOOD PRESSURE: 63 MMHG

## 2025-05-30 DIAGNOSIS — I77.810 DILATION OF THORACIC AORTA: ICD-10-CM

## 2025-05-30 LAB
CREAT BLD-MCNC: 1.3 MG/DL (ref 0.7–1.3)
EGFRCR SERPLBLD CKD-EPI 2021: 61 ML/MIN/1.73M2 (ref 60–?)

## 2025-05-30 PROCEDURE — 71275 CT ANGIOGRAPHY CHEST: CPT | Performed by: FAMILY MEDICINE

## 2025-05-30 PROCEDURE — 82565 ASSAY OF CREATININE: CPT

## 2025-05-30 RX ORDER — DILTIAZEM HYDROCHLORIDE 5 MG/ML
5 INJECTION INTRAVENOUS SEE ADMIN INSTRUCTIONS
Status: DISCONTINUED | OUTPATIENT
Start: 2025-05-30 | End: 2025-06-01

## 2025-05-30 RX ORDER — METOPROLOL TARTRATE 1 MG/ML
5 INJECTION, SOLUTION INTRAVENOUS SEE ADMIN INSTRUCTIONS
Status: DISCONTINUED | OUTPATIENT
Start: 2025-05-30 | End: 2025-06-01

## 2025-05-30 NOTE — IMAGING NOTE
Peter to CT Rm 4 for gated thoracic study.     Positioned pt on table. Procedure explained and questions answered. Vital signs monitored and noted in Flowsheet.    GFR = 61  Contrast injected followed by saline flush at 09:35  Contrast = 90 ml  0.9 NS flush = 100 ml  HR during scan = 67 BPM     Patient tolerated the procedure without complication. Denies any contrast reaction. IV removed and patient instructed to hydrate well for next 48 hrs to facilitate contrast excretion.     Sylvester escorted to patient dressing room and discharged

## 2025-06-09 NOTE — TELEPHONE ENCOUNTER
ALPRAZolam 0.5 MG Oral Tab not due until next week.     [facility] [place]
Last OV 1/5/18  Last refilled 2/6/18  #60  0 refills
Script printed.
Script sent.  Patient notified
None

## 2025-06-10 DIAGNOSIS — F41.9 ANXIETY: ICD-10-CM

## 2025-06-10 DIAGNOSIS — R03.0 ELEVATED BLOOD PRESSURE READING: ICD-10-CM

## 2025-06-10 DIAGNOSIS — I77.810 DILATION OF THORACIC AORTA: ICD-10-CM

## 2025-06-10 RX ORDER — LISINOPRIL 10 MG/1
10 TABLET ORAL DAILY
Qty: 90 TABLET | Refills: 3 | Status: SHIPPED | OUTPATIENT
Start: 2025-06-10

## 2025-06-10 RX ORDER — ROSUVASTATIN CALCIUM 10 MG/1
10 TABLET, COATED ORAL NIGHTLY
Qty: 90 TABLET | Refills: 3 | Status: SHIPPED | OUTPATIENT
Start: 2025-06-10

## 2025-06-10 RX ORDER — ALPRAZOLAM 0.25 MG
0.25 TABLET ORAL EVERY 6 HOURS PRN
Qty: 120 TABLET | Refills: 0 | Status: SHIPPED | OUTPATIENT
Start: 2025-06-10 | End: 2025-06-12 | Stop reason: DRUGHIGH

## 2025-06-10 NOTE — TELEPHONE ENCOUNTER
Last OV:05/22/2025  Last refill:alprazolam: 01/16/2025 120 tabs, 0 refill             Lisinopril 09/10/2024, 90 tabs, 3 refill           Rosuvastatin 09/10/2024 90 tabs, 3 refill     Medication pended, please sign if appropriate     PATIENT ALSO AWAITING CALL FROM DR MENDOZA ON THE CARDIOLOGY PART OF HIS CT SCAN.

## 2025-06-10 NOTE — TELEPHONE ENCOUNTER
[facility] - [place], [place] - [address] [phone], [phone]   [address] [address]   Phone: [phone] Fax: [phone]       PATIENT REQUESTS REFILL FOR     rosuvastatin 10 MG Oral Tab 90 tablet 3 9/10/2024 --   Sig:   Take 1 tablet (10 mg total) by mouth nightly.     Route:   Oral       lisinopril 10 MG Oral Tab 90 tablet 3 9/10/2024 --   Sig:   Take 1 tablet (10 mg total) by mouth daily.     Route:   Oral              ALPRAZolam 0.25 MG Oral Tab 120 tablet 0 1/16/2025 --   Sig:   Take 1 tablet (0.25 mg total) by mouth every 6 (six) hours as needed.       PATIENT ALSO AWAITING CALL FROM DR MENDOZA ON THE CARDIOLOGY PART OF HIS CT SCAN.

## 2025-06-11 ENCOUNTER — TELEPHONE (OUTPATIENT)
Dept: FAMILY MEDICINE CLINIC | Facility: CLINIC | Age: 65
End: 2025-06-11

## 2025-06-11 DIAGNOSIS — F41.9 ANXIETY: ICD-10-CM

## 2025-06-11 NOTE — TELEPHONE ENCOUNTER
PT CALLED AND ADV THAT WRONG DOSAGE OF ALPRAZOLAM WAS SENT IN YETERDAY.    PT TAKES:     ALPRAZolam 0.5 MG Oral Tab     ** LOOKS LIKE WE RECEIVED REQUEST FROM PHARMACY TODAY **    PLEASE SEND TO [facility]       THANK YOU

## 2025-06-11 NOTE — TELEPHONE ENCOUNTER
PT CALLED AND ADV THAT WRONG DOSAGE OF ALPRAZOLAM WAS SENT IN YETERDAY.     PT TAKES:      ALPRAZolam 0.5 MG Oral Tab      ** LOOKS LIKE WE RECEIVED REQUEST FROM PHARMACY TODAY **     PLEASE SEND TO [facility]         THANK YOU

## 2025-06-12 RX ORDER — ALPRAZOLAM 0.5 MG
0.5 TABLET ORAL 3 TIMES DAILY PRN
Qty: 90 TABLET | Refills: 0 | Status: SHIPPED | OUTPATIENT
Start: 2025-06-12

## 2025-06-12 NOTE — TELEPHONE ENCOUNTER
Spoke to pharmacist Hanna HUTCHINS At [facility] will cancel the alprazolam 0.25 mg prescription.

## 2025-06-24 ENCOUNTER — LABORATORY ENCOUNTER (OUTPATIENT)
Dept: LAB | Age: 65
End: 2025-06-24
Attending: FAMILY MEDICINE
Payer: MEDICARE

## 2025-06-24 DIAGNOSIS — Z12.5 SCREENING FOR MALIGNANT NEOPLASM OF PROSTATE: ICD-10-CM

## 2025-06-24 DIAGNOSIS — Z13.6 SCREENING FOR CARDIOVASCULAR CONDITION: ICD-10-CM

## 2025-06-24 DIAGNOSIS — Z00.00 ENCOUNTER FOR ANNUAL HEALTH EXAMINATION: ICD-10-CM

## 2025-06-24 LAB
ALBUMIN SERPL-MCNC: 4.8 G/DL (ref 3.2–4.8)
ALBUMIN/GLOB SERPL: 2.1 {RATIO} (ref 1–2)
ALP LIVER SERPL-CCNC: 40 U/L (ref 45–117)
ALT SERPL-CCNC: 37 U/L (ref 10–49)
ANION GAP SERPL CALC-SCNC: 8 MMOL/L (ref 0–18)
AST SERPL-CCNC: 34 U/L (ref ?–34)
BASOPHILS # BLD AUTO: 0.02 X10(3) UL (ref 0–0.2)
BASOPHILS NFR BLD AUTO: 0.5 %
BILIRUB SERPL-MCNC: 0.6 MG/DL (ref 0.2–1.1)
BUN BLD-MCNC: 13 MG/DL (ref 9–23)
CALCIUM BLD-MCNC: 9.7 MG/DL (ref 8.7–10.6)
CHLORIDE SERPL-SCNC: 103 MMOL/L (ref 98–112)
CHOLEST SERPL-MCNC: 99 MG/DL (ref ?–200)
CO2 SERPL-SCNC: 30 MMOL/L (ref 21–32)
COMPLEXED PSA SERPL-MCNC: 1.29 NG/ML (ref ?–4)
CREAT BLD-MCNC: 1.16 MG/DL (ref 0.7–1.3)
EGFRCR SERPLBLD CKD-EPI 2021: 69 ML/MIN/1.73M2 (ref 60–?)
EOSINOPHIL # BLD AUTO: 0.12 X10(3) UL (ref 0–0.7)
EOSINOPHIL NFR BLD AUTO: 3 %
ERYTHROCYTE [DISTWIDTH] IN BLOOD BY AUTOMATED COUNT: 12.4 %
FASTING PATIENT LIPID ANSWER: YES
FASTING STATUS PATIENT QL REPORTED: YES
GLOBULIN PLAS-MCNC: 2.3 G/DL (ref 2–3.5)
GLUCOSE BLD-MCNC: 96 MG/DL (ref 70–99)
HCT VFR BLD AUTO: 45.4 % (ref 39–53)
HDLC SERPL-MCNC: 42 MG/DL (ref 40–59)
HGB BLD-MCNC: 15.4 G/DL (ref 13–17.5)
IMM GRANULOCYTES # BLD AUTO: 0 X10(3) UL (ref 0–1)
IMM GRANULOCYTES NFR BLD: 0 %
LDLC SERPL CALC-MCNC: 43 MG/DL (ref ?–100)
LYMPHOCYTES # BLD AUTO: 1.29 X10(3) UL (ref 1–4)
LYMPHOCYTES NFR BLD AUTO: 31.9 %
MCH RBC QN AUTO: 32.3 PG (ref 26–34)
MCHC RBC AUTO-ENTMCNC: 33.9 G/DL (ref 31–37)
MCV RBC AUTO: 95.2 FL (ref 80–100)
MONOCYTES # BLD AUTO: 0.45 X10(3) UL (ref 0.1–1)
MONOCYTES NFR BLD AUTO: 11.1 %
NEUTROPHILS # BLD AUTO: 2.17 X10 (3) UL (ref 1.5–7.7)
NEUTROPHILS # BLD AUTO: 2.17 X10(3) UL (ref 1.5–7.7)
NEUTROPHILS NFR BLD AUTO: 53.5 %
NONHDLC SERPL-MCNC: 57 MG/DL (ref ?–130)
OSMOLALITY SERPL CALC.SUM OF ELEC: 292 MOSM/KG (ref 275–295)
PLATELET # BLD AUTO: 151 10(3)UL (ref 150–450)
POTASSIUM SERPL-SCNC: 4.6 MMOL/L (ref 3.5–5.1)
PROT SERPL-MCNC: 7.1 G/DL (ref 5.7–8.2)
PSA SERPL-MCNC: 1.29 NG/ML (ref ?–4)
RBC # BLD AUTO: 4.77 X10(6)UL (ref 3.8–5.8)
SODIUM SERPL-SCNC: 141 MMOL/L (ref 136–145)
TRIGL SERPL-MCNC: 61 MG/DL (ref 30–149)
VLDLC SERPL CALC-MCNC: 9 MG/DL (ref 0–30)
WBC # BLD AUTO: 4.1 X10(3) UL (ref 4–11)

## 2025-06-24 PROCEDURE — 80061 LIPID PANEL: CPT

## 2025-06-24 PROCEDURE — 36415 COLL VENOUS BLD VENIPUNCTURE: CPT

## 2025-06-24 PROCEDURE — 84153 ASSAY OF PSA TOTAL: CPT | Performed by: FAMILY MEDICINE

## 2025-06-24 PROCEDURE — 80053 COMPREHEN METABOLIC PANEL: CPT

## 2025-06-24 PROCEDURE — 85025 COMPLETE CBC W/AUTO DIFF WBC: CPT

## 2025-07-11 DIAGNOSIS — F41.9 ANXIETY: ICD-10-CM

## 2025-07-11 NOTE — TELEPHONE ENCOUNTER
Patient requests refill    ALPRAZOLAM 0.5 MG Oral Tab     [facility]

## 2025-07-14 DIAGNOSIS — F41.9 ANXIETY: ICD-10-CM

## 2025-07-14 NOTE — TELEPHONE ENCOUNTER
06/16/2025  LAST WRITTEN: 06/12/2025  Quantity: 90    Recent Visits  Date Type Provider Dept   05/22/25 Office Visit Lynn Marroquin, DO [facility]

## 2025-07-15 RX ORDER — ALPRAZOLAM 0.5 MG
0.5 TABLET ORAL 3 TIMES DAILY PRN
Qty: 90 TABLET | Refills: 0 | Status: SHIPPED | OUTPATIENT
Start: 2025-07-15 | End: 2025-08-14

## 2025-07-16 RX ORDER — ALPRAZOLAM 0.5 MG
0.5 TABLET ORAL 3 TIMES DAILY PRN
Qty: 90 TABLET | Refills: 0 | OUTPATIENT
Start: 2025-07-16

## 2025-07-16 NOTE — TELEPHONE ENCOUNTER
Disp Refills Start End    ALPRAZolam 0.5 MG Oral Tab 90 tablet 0 7/15/2025 8/14/2025    Sig - Route: Take 1 tablet (0.5 mg total) by mouth 3 (three) times daily as needed for Anxiety. - Oral    Sent to pharmacy as: ALPRAZolam 0.5 MG Oral Tablet (Xanax)    E-Prescribing Status: Receipt confirmed by pharmacy (7/15/2025  2:51 PM CDT)    No prior authorization was found for this prescription.    Found prior authorization for another prescription for the same medication: Closed - Prior Authorization not required for patient/medication      Associated Diagnoses    Anxiety        Pharmacy    [facility] - [facility] [address] [phone], [phone]

## 2025-08-12 DIAGNOSIS — F41.9 ANXIETY: ICD-10-CM

## 2025-08-12 RX ORDER — ALPRAZOLAM 0.5 MG
0.5 TABLET ORAL 3 TIMES DAILY PRN
Qty: 90 TABLET | Refills: 0 | Status: CANCELLED | OUTPATIENT
Start: 2025-08-12 | End: 2025-09-11

## 2025-08-27 DIAGNOSIS — R03.0 ELEVATED BLOOD PRESSURE READING: ICD-10-CM

## 2025-08-27 DIAGNOSIS — I77.810 DILATION OF THORACIC AORTA: ICD-10-CM

## 2025-08-28 RX ORDER — LISINOPRIL 10 MG/1
10 TABLET ORAL DAILY
Qty: 90 TABLET | Refills: 3 | Status: SHIPPED | OUTPATIENT
Start: 2025-08-28

## 2025-08-28 RX ORDER — ROSUVASTATIN CALCIUM 10 MG/1
10 TABLET, COATED ORAL NIGHTLY
Qty: 90 TABLET | Refills: 3 | Status: SHIPPED | OUTPATIENT
Start: 2025-08-28

## (undated) DIAGNOSIS — F41.9 ANXIETY: ICD-10-CM

## (undated) DIAGNOSIS — Z01.818 PRE-OP TESTING: ICD-10-CM

## (undated) DIAGNOSIS — E87.5 HYPERKALEMIA: Primary | ICD-10-CM

## (undated) DIAGNOSIS — I77.810 DILATION OF THORACIC AORTA (HCC): ICD-10-CM

## (undated) DIAGNOSIS — Z11.59 ENCOUNTER FOR SCREENING FOR OTHER VIRAL DISEASES: Primary | ICD-10-CM

## (undated) DIAGNOSIS — R03.0 ELEVATED BLOOD PRESSURE READING: ICD-10-CM

## (undated) DIAGNOSIS — M79.673 PAIN OF FOOT, UNSPECIFIED LATERALITY: Primary | ICD-10-CM

## (undated) DIAGNOSIS — I71.2 THORACIC AORTIC ANEURYSM WITHOUT RUPTURE (HCC): Primary | ICD-10-CM

## (undated) DIAGNOSIS — I25.10 ATHEROSCLEROTIC CARDIOVASCULAR DISEASE: ICD-10-CM

## (undated) DIAGNOSIS — I77.810 DILATION OF THORACIC AORTA (HCC): Primary | ICD-10-CM

## (undated) NOTE — LETTER
Date: 6/20/2024    Patient Name: Sylvester Altamirano          To Whom it may concern:    The above patient was seen at [facility] for treatment of a medical condition.    The patient may return to work on 7/16/24 with no limitations.         Sincerely,    Lynn Marroquin, DO

## (undated) NOTE — LETTER
Date: 4/23/2018    Patient Name: Severo Mina          To Whom it may concern: This letter has been written at the patient's request. The above patient was seen at the [facility] for treatment of a medical condition.     This patient should

## (undated) NOTE — LETTER
Date: 1/4/2024    Patient Name: Sylvester Altamirano          To Whom it may concern:    This letter has been written at the patient's request. The above patient was seen at the [facility] for treatment of a medical condition.    The patient may return to work on 1/4/24.         Sincerely,    Lynn Marroquin DO

## (undated) NOTE — MR AVS SNAPSHOT
After Visit Summary   2/23/2021    [facility] Scale    MRN: [id number]           Visit Information     Date & Time  2/23/2021  1:45 PM Provider  Clifford Owen [facility] [address], Krystina Betts [facility] Dept.  Phone  [phone] If you receive a survey from [employer], please take a few minutes to complete it and provide feedback. We strive to deliver the best patient experience and are looking for ways to make improvements. Your feedback will help us do so.  For more information EMERGENCY ROOM Life-threatening emergencies needing immediate intervention at a hospital emergency room.  Average cost  $2,300*   *Cost varies based on your insurance coverage  For more information about hours, locations or appointment options available at

## (undated) NOTE — LETTER
Date: 4/19/2018    Patient Name: Rivka Syed          To Whom it may concern: This letter has been written at the patient's request. The above patient was seen at the [facility] for treatment of a medical condition.     The patient should

## (undated) NOTE — LETTER
Date: 2/24/2022    Patient Name: Meaghan Logan          To Whom it may concern: This letter has been written at the patient's request. The above patient was seen at the [facility] for treatment of a medical condition. He is being treated for foot pain. I recommend he work shorter hours for the next 8 weeks. I also recommend no prolonged standing in one place for more than 4 hours at a time.        Sincerely,    Royal Garcia, DO

## (undated) NOTE — Clinical Note
Kraig Castillo saw Evita Val in the office today. He presents with chronic left groin pain following open inguinal hernia repair by an outside surgeon 3 years ago. On physical examination everything is healing well.   I am recommending CT scan of the pelvis to mae

## (undated) NOTE — LETTER
Date: 3/21/2024    Patient Name: Sylvester Altamirano          To Whom it may concern:    This letter has been written at the patient's request. [facility].    This patient should be excused from attending work/school from 3/20/24 through 3/31/24.    The patient may return to work/school on 4/1/24 with no restrictions.       Sincerely,    Lynn Marroquin, DO

## (undated) NOTE — LETTER
Date: 5/3/2024    Patient Name: Sylvester Altamirano          To Whom it may concern:    The above patient was seen at [facility] for treatment of a medical condition.    This patient requires accommodations for work. He should be standing and bending for no longer than 2 hrs at a time. He should be allowed to have a seated job the rest of his shift.     He will be re-evaluated in 3 months.       Sincerely,    Lynn Marroquin, DO

## (undated) NOTE — LETTER
11/22/19        Niya Fairbanks  [address]      Dear Jayme Hutson,    Our records indicate that you have outstanding lab work and or testing that was ordered for you and has not yet been completed:  Orders Placed This Encounter      CT AB

## (undated) NOTE — LETTER
Date: 6/25/2021    Patient Name: Yoselin Herbert          To Whom it may concern: The above patient was seen at the [facility] for treatment of a medical condition.     This patient should be excused from attending work/school from 6/21/21 thr

## (undated) NOTE — LETTER
Date: 3/21/2024    Patient Name: Sylvester Altamirano          To Whom it may concern:    This letter has been written at the patient's request. The above patient was seen at [facility] for treatment of a medical condition.    This patient should be excused from attending work/school from 3/20/24 through 3/31/24.    The patient may return to work/school on 4/1/24.       Sincerely,    Lynn Marroquin, DO

## (undated) NOTE — LETTER
Date: 4/27/2018    Patient Name: Ziyad Pinto          To Whom it may concern: The above patient was seen at the [facility] for treatment of a medical condition. He was seen for a back injury that occurred at work.      The patient may retu

## (undated) NOTE — LETTER
Date: 6/20/2023    Patient Name: Aidan Granados          To Whom it may concern: The above patient was seen at the [facility] for treatment of a medical condition. The patient may return to work/school on 6/21/23 with no limitations.         Sincerely,    Navdeep Valenzuela, DO

## (undated) NOTE — LETTER
Date: 4/27/2018    Patient Name: Jose Miguel Parsons          To Whom it may concern: The above patient was seen at the [facility] for treatment of a medical condition. He was treated for a work-related back injury.      The patient may return to

## (undated) NOTE — MR AVS SNAPSHOT
[facility] [place] [address]  [id number] [phone]               Thank you for choosing us for your health care visit with Maria De Jesus Brewster MD.  We are glad to serve you and happy to provide you with this summary [facility] [address]   Phone:  [phone]   Fax:  [phone]             Brent Cardona MD   [address] [place] [address]   Phone:  [phone]   Fax:  [phone]    Diagnoses:  Frequent urination   Order:  Urology - External    Manoj aJckson Allergies as of Jun 22, 2017     No Known Allergies                Today's Vital Signs     BP Pulse Temp Height Weight BMI    132/88 mmHg 61 97.9 °F (36.6 °C) (Oral) 6' 4\" (1.93 m) 219 lb (99.338 kg) 26.67 kg/m2         Current Medications          This l